# Patient Record
Sex: MALE | Race: WHITE | NOT HISPANIC OR LATINO | Employment: OTHER | URBAN - METROPOLITAN AREA
[De-identification: names, ages, dates, MRNs, and addresses within clinical notes are randomized per-mention and may not be internally consistent; named-entity substitution may affect disease eponyms.]

---

## 2017-06-29 ENCOUNTER — OFFICE VISIT (OUTPATIENT)
Dept: FAMILY MEDICINE | Facility: CLINIC | Age: 67
End: 2017-06-29
Payer: MEDICARE

## 2017-06-29 VITALS
DIASTOLIC BLOOD PRESSURE: 76 MMHG | BODY MASS INDEX: 29.44 KG/M2 | SYSTOLIC BLOOD PRESSURE: 122 MMHG | WEIGHT: 217.38 LBS | HEART RATE: 83 BPM | HEIGHT: 72 IN | TEMPERATURE: 98 F | OXYGEN SATURATION: 98 %

## 2017-06-29 DIAGNOSIS — R00.1 BRADYCARDIA: ICD-10-CM

## 2017-06-29 DIAGNOSIS — Z00.00 ROUTINE HEALTH MAINTENANCE: Primary | ICD-10-CM

## 2017-06-29 DIAGNOSIS — Z12.5 SCREENING PSA (PROSTATE SPECIFIC ANTIGEN): ICD-10-CM

## 2017-06-29 DIAGNOSIS — E78.00 ELEVATED CHOLESTEROL: ICD-10-CM

## 2017-06-29 PROCEDURE — 99397 PER PM REEVAL EST PAT 65+ YR: CPT | Mod: S$GLB,,, | Performed by: FAMILY MEDICINE

## 2017-06-29 RX ORDER — CETIRIZINE HYDROCHLORIDE 10 MG/1
10 TABLET ORAL DAILY
COMMUNITY

## 2017-07-01 LAB
ALBUMIN SERPL-MCNC: 4 G/DL (ref 3.6–5.1)
ALBUMIN/GLOB SERPL: 1.7 (CALC) (ref 1–2.5)
ALP SERPL-CCNC: 59 U/L (ref 40–115)
ALT SERPL-CCNC: 13 U/L (ref 9–46)
AST SERPL-CCNC: 16 U/L (ref 10–35)
BASOPHILS # BLD AUTO: 17 CELLS/UL (ref 0–200)
BASOPHILS NFR BLD AUTO: 0.3 %
BILIRUB SERPL-MCNC: 0.7 MG/DL (ref 0.2–1.2)
BUN SERPL-MCNC: 17 MG/DL (ref 7–25)
BUN/CREAT SERPL: NORMAL (CALC) (ref 6–22)
CALCIUM SERPL-MCNC: 9.3 MG/DL (ref 8.6–10.3)
CHLORIDE SERPL-SCNC: 109 MMOL/L (ref 98–110)
CHOLEST SERPL-MCNC: 204 MG/DL (ref 125–200)
CHOLEST/HDLC SERPL: 5.1 (CALC)
CO2 SERPL-SCNC: 26 MMOL/L (ref 20–31)
CREAT SERPL-MCNC: 0.94 MG/DL (ref 0.7–1.25)
EOSINOPHIL # BLD AUTO: 66 CELLS/UL (ref 15–500)
EOSINOPHIL NFR BLD AUTO: 1.2 %
ERYTHROCYTE [DISTWIDTH] IN BLOOD BY AUTOMATED COUNT: 13.9 % (ref 11–15)
GFR SERPL CREATININE-BSD FRML MDRD: 84 ML/MIN/1.73M2
GLOBULIN SER CALC-MCNC: 2.4 G/DL (CALC) (ref 1.9–3.7)
GLUCOSE SERPL-MCNC: 95 MG/DL (ref 65–99)
HCT VFR BLD AUTO: 44.1 % (ref 38.5–50)
HDLC SERPL-MCNC: 40 MG/DL
HGB BLD-MCNC: 14.8 G/DL (ref 13.2–17.1)
LDLC SERPL CALC-MCNC: 122 MG/DL (CALC)
LYMPHOCYTES # BLD AUTO: 2178 CELLS/UL (ref 850–3900)
LYMPHOCYTES NFR BLD AUTO: 39.6 %
MCH RBC QN AUTO: 29.9 PG (ref 27–33)
MCHC RBC AUTO-ENTMCNC: 33.4 G/DL (ref 32–36)
MCV RBC AUTO: 89.4 FL (ref 80–100)
MONOCYTES # BLD AUTO: 451 CELLS/UL (ref 200–950)
MONOCYTES NFR BLD AUTO: 8.2 %
NEUTROPHILS # BLD AUTO: 2789 CELLS/UL (ref 1500–7800)
NEUTROPHILS NFR BLD AUTO: 50.7 %
NONHDLC SERPL-MCNC: 164 MG/DL (CALC)
PLATELET # BLD AUTO: 196 THOUSAND/UL (ref 140–400)
PMV BLD REES-ECKER: 8.5 FL (ref 7.5–12.5)
POTASSIUM SERPL-SCNC: 4.4 MMOL/L (ref 3.5–5.3)
PROT SERPL-MCNC: 6.4 G/DL (ref 6.1–8.1)
PSA SERPL-MCNC: 0.7 NG/ML
RBC # BLD AUTO: 4.94 MILLION/UL (ref 4.2–5.8)
SODIUM SERPL-SCNC: 143 MMOL/L (ref 135–146)
TRIGL SERPL-MCNC: 209 MG/DL
TSH SERPL-ACNC: 1.45 MIU/L (ref 0.4–4.5)
WBC # BLD AUTO: 5.5 THOUSAND/UL (ref 3.8–10.8)

## 2017-07-03 ENCOUNTER — TELEPHONE (OUTPATIENT)
Dept: FAMILY MEDICINE | Facility: CLINIC | Age: 67
End: 2017-07-03

## 2017-07-03 NOTE — TELEPHONE ENCOUNTER
Notified the pt and he advised that he was not fasting for this blood work, pt advised to repeat in 1 year

## 2017-07-03 NOTE — TELEPHONE ENCOUNTER
Your lab work is very good    Your cholesterol is a little elevated including triglycerides but not to the point where I would add medicines to your regimen.    Recheck in one year

## 2017-07-08 NOTE — PROGRESS NOTES
Patient ID: Jose Rodríguez is a 67 y.o. male.    Chief Complaint: Annual Exam (wellness visit)    HPI      Jose Rodríguez is a 67 y.o. male. here for annual exam.   No current complaints.  Overall doing fair.  Still recovering from episode of serotonin syndrome.  Continues to get weak at some point in the day.  Mood is stable however does have some anxiety and depressive symptoms but not wanting to restart medicines at this time.  ALLERGIC rhinitis-stable with Zyrtec-periodic exacerbations        Review of Symptoms    Constitutional: Negative.    HENT: Negative.    Eyes: Negative.    Respiratory: Negative.    Cardiovascular: Negative.    Gastrointestinal: Negative.    Endocrine: Negative.    Genitourinary: Negative.    Musculoskeletal: Negative.    Skin: Negative.    Allergic/Immunologic: Negative.    Neurological: Negative.    Hematological: Negative.    Psychiatric/Behavioral: Negative.      Except as above in HPI        Physical  Exam    Constitutional:  Oriented to person, place, and time. Appears well-developed and well-nourished.     HENT:   Head: Normocephalic and atraumatic.     Right Ear: Tympanic membrane, external ear and ear canal normal.     Left Ear: Tympanic membrane, external ear and ear canal normal.     Nose: Nose normal. No rhinorrhea or nasal deformity.     Mouth/Throat: Uvula is midline, oropharynx is clear and moist and mucous membranes are normal.      Eyes: Conjunctivae are normal. Right eye exhibits no discharge. Left eye exhibits no discharge. No scleral icterus.     Neck:  No JVD present. No tracheal deviation  [x]  Neck supple.   [x]  No Carotid bruit    Cardiovascular: Normal rate, regular rhythm and normal heart sounds.      Pulmonary/Chest: Effort normal and breath sounds normal. No stridor. No respiratory distress. No wheezes. No rales.      Musculoskeletal: Normal range of motion. No edema or tenderness.   No deformity     Lymphadenopathy:  No cervical adenopathy.      Neurological:  Alert and oriented to person, place, and time. Coordination normal.     Skin: Skin is warm and dry. No rash noted.     Psychiatric: Normal mood and affect. Speech is normal and behavior is normal. Judgment and thought content normal.     Complete Blood Count  Lab Results   Component Value Date    RBC 4.94 06/30/2017    HGB 14.8 06/30/2017    HCT 44.1 06/30/2017    MCV 89.4 06/30/2017    MCH 29.9 06/30/2017    MCHC 33.4 06/30/2017    RDW 13.9 06/30/2017     06/30/2017    MPV 8.5 06/30/2017    GRAN 2.8 01/28/2016    GRAN 54.9 01/28/2016    LYMPH 2,178 06/30/2017    LYMPH 39.6 06/30/2017    MONO 451 06/30/2017    MONO 8.2 06/30/2017    EOS 66 06/30/2017    BASO 17 06/30/2017    EOSINOPHIL 1.2 06/30/2017    BASOPHIL 0.3 06/30/2017    DIFFMETHOD Automated 01/28/2016       Comprehensive Metabolic Panel  Lab Results   Component Value Date    GLU 95 06/30/2017    BUN 17 06/30/2017    CREATININE 0.94 06/30/2017     06/30/2017    K 4.4 06/30/2017     06/30/2017    PROT 6.4 06/30/2017    ALBUMIN 4.0 06/30/2017    BILITOT 0.7 06/30/2017    AST 16 06/30/2017    ALKPHOS 59 06/30/2017    CO2 26 06/30/2017    ALT 13 06/30/2017    ANIONGAP 6 (L) 01/28/2016    EGFRNONAA 84 06/30/2017    ESTGFRAFRICA 98 06/30/2017       TSH  Lab Results   Component Value Date    TSH 1.45 06/30/2017       Assessment / Plan:      ICD-10-CM ICD-9-CM   1. Routine health maintenance Z00.00 V70.0   2. Screening PSA (prostate specific antigen) Z12.5 V76.44   3. Bradycardia R00.1 427.89   4. Elevated cholesterol E78.00 272.0     Routine health maintenance  -     Comprehensive metabolic panel; Future  -     CBC auto differential; Future  -     Lipid panel; Future  -     TSH; Future  -     PSA, Screening; Future    Screening PSA (prostate specific antigen)  -     Comprehensive metabolic panel; Future  -     CBC auto differential; Future  -     Lipid panel; Future  -     TSH; Future  -     PSA, Screening;  Future    Bradycardia  -     Comprehensive metabolic panel; Future  -     CBC auto differential; Future  -     Lipid panel; Future  -     TSH; Future  -     PSA, Screening; Future    Elevated cholesterol  -     Comprehensive metabolic panel; Future  -     CBC auto differential; Future  -     Lipid panel; Future  -     TSH; Future  -     PSA, Screening; Future

## 2018-01-12 ENCOUNTER — PES CALL (OUTPATIENT)
Dept: ADMINISTRATIVE | Facility: CLINIC | Age: 68
End: 2018-01-12

## 2018-04-13 ENCOUNTER — PES CALL (OUTPATIENT)
Dept: ADMINISTRATIVE | Facility: CLINIC | Age: 68
End: 2018-04-13

## 2018-06-08 LAB
TSH SERPL DL<=0.005 MIU/L-ACNC: 4.06 UIU/ML (ref 0.41–5.9)
VITAMIN B12: 387.6

## 2018-07-02 ENCOUNTER — TELEPHONE (OUTPATIENT)
Dept: ADMINISTRATIVE | Facility: HOSPITAL | Age: 68
End: 2018-07-02

## 2018-07-02 ENCOUNTER — OFFICE VISIT (OUTPATIENT)
Dept: FAMILY MEDICINE | Facility: CLINIC | Age: 68
End: 2018-07-02
Payer: MEDICARE

## 2018-07-02 VITALS
DIASTOLIC BLOOD PRESSURE: 80 MMHG | HEIGHT: 72 IN | WEIGHT: 227.38 LBS | BODY MASS INDEX: 30.8 KG/M2 | HEART RATE: 77 BPM | TEMPERATURE: 98 F | OXYGEN SATURATION: 95 % | SYSTOLIC BLOOD PRESSURE: 118 MMHG

## 2018-07-02 DIAGNOSIS — R00.1 BRADYCARDIA: ICD-10-CM

## 2018-07-02 DIAGNOSIS — Z12.5 SCREENING PSA (PROSTATE SPECIFIC ANTIGEN): ICD-10-CM

## 2018-07-02 DIAGNOSIS — R53.83 FATIGUE, UNSPECIFIED TYPE: ICD-10-CM

## 2018-07-02 DIAGNOSIS — Z01.818 PREOPERATIVE EXAMINATION: ICD-10-CM

## 2018-07-02 DIAGNOSIS — Z23 NEED FOR PNEUMOCOCCAL VACCINATION: ICD-10-CM

## 2018-07-02 DIAGNOSIS — Z00.00 ROUTINE HEALTH MAINTENANCE: Primary | ICD-10-CM

## 2018-07-02 DIAGNOSIS — E78.00 ELEVATED CHOLESTEROL: ICD-10-CM

## 2018-07-02 PROCEDURE — G0009 ADMIN PNEUMOCOCCAL VACCINE: HCPCS | Mod: S$GLB,,, | Performed by: FAMILY MEDICINE

## 2018-07-02 PROCEDURE — 99397 PER PM REEVAL EST PAT 65+ YR: CPT | Mod: S$GLB,,, | Performed by: FAMILY MEDICINE

## 2018-07-02 PROCEDURE — 90732 PPSV23 VACC 2 YRS+ SUBQ/IM: CPT | Mod: S$GLB,,, | Performed by: FAMILY MEDICINE

## 2018-07-02 PROCEDURE — 93000 ELECTROCARDIOGRAM COMPLETE: CPT | Mod: S$GLB,,, | Performed by: INTERNAL MEDICINE

## 2018-07-02 RX ORDER — TRIAMCINOLONE ACETONIDE 55 UG/1
SPRAY, METERED NASAL
COMMUNITY
End: 2022-07-15

## 2018-07-02 RX ORDER — FAMCICLOVIR 500 MG/1
TABLET ORAL
COMMUNITY
End: 2022-07-15

## 2018-07-02 RX ORDER — LANOLIN ALCOHOL/MO/W.PET/CERES
100 CREAM (GRAM) TOPICAL DAILY
COMMUNITY

## 2018-07-02 RX ORDER — CITALOPRAM 10 MG/1
TABLET ORAL
COMMUNITY
End: 2019-08-16 | Stop reason: ALTCHOICE

## 2018-07-03 ENCOUNTER — TELEPHONE (OUTPATIENT)
Dept: FAMILY MEDICINE | Facility: CLINIC | Age: 68
End: 2018-07-03

## 2018-07-03 NOTE — TELEPHONE ENCOUNTER
----- Message from Aj Patrick MD sent at 7/3/2018  2:30 AM CDT -----  tsh is a bit high but still normal  Will repeat   b 12 is wnl      I notified the pt

## 2018-07-06 ENCOUNTER — TELEPHONE (OUTPATIENT)
Dept: FAMILY MEDICINE | Facility: CLINIC | Age: 68
End: 2018-07-06

## 2018-07-06 LAB
ALBUMIN: 4.2 GRAM/DL (ref 3.5–5)
ALP SERPL-CCNC: 64 UNIT/L (ref 38–126)
ALT SERPL W P-5'-P-CCNC: 12 UNIT/L (ref 7–56)
ANION GAP SERPL CALC-SCNC: 20 MEQ/L (ref 9–18)
APPEARANCE UR: CLEAR
AST SERPL-CCNC: 17 UNIT/L (ref 7–40)
BASOPHILS ABSOLUTE COUNT: 0 K/UL (ref 0–0.2)
BASOPHILS NFR BLD: 0.5 % (ref 0–2)
BILIRUB SERPL-MCNC: 0.9 MG/DL (ref 0–1.2)
BILIRUB UR QL STRIP: NEGATIVE
BUN BLD-MCNC: 20 MG/DL (ref 7–21)
BUN/CREAT SERPL: 18 RATIO (ref 6–22)
CALC OSMOLALITY: 289 MOSM/KG (ref 275–295)
CALCIUM SERPL-MCNC: 9.3 MG/DL (ref 8.5–10.3)
CHLORIDE SERPL-SCNC: 105 MEQ/L (ref 98–107)
CHOL/HDLC RATIO: 5
CHOLEST SERPL-MSCNC: 215 MG/DL (ref 100–200)
CO2 SERPL-SCNC: 23 MEQ/L (ref 21–31)
COLOR UR: YELLOW
CREAT SERPL-MCNC: 1.1 MG/DL (ref 0.7–1.2)
DIFFERENTIAL TYPE: NORMAL
EOSINOPHIL NFR BLD: 0.8 % (ref 0–4)
EOSINOPHILS ABSOLUTE COUNT: 0 K/UL (ref 0–0.7)
ERYTHROCYTE [DISTWIDTH] IN BLOOD BY AUTOMATED COUNT: 14 GRAM/DL (ref 12–15.3)
FREE T4: 1.3 NANOGRAM/DL (ref 0.9–1.7)
GFR: 68.9 ML/MIN/1.73M2
GLUCOSE SERPL-MCNC: 92 MG/DL (ref 70–100)
GLUCOSE UR QL STRIP: NEGATIVE
HCT VFR BLD AUTO: 42.1 % (ref 40–52)
HDLC SERPL-MCNC: 43 MG/DL (ref 40–75)
HGB BLD-MCNC: 14.5 GRAM/DL (ref 13.6–17.5)
HGB UR QL STRIP: NEGATIVE
KETONES UR QL STRIP: NEGATIVE
LDLC SERPL CALC-MCNC: 149 MG/DL (ref 0–130)
LEUKOCYTE ESTERASE UR QL STRIP: NEGATIVE
LYMPHOCYTES %: 32.8 % (ref 15–45)
LYMPHOCYTES ABSOLUTE COUNT: 2 K/UL (ref 1–4.2)
MCH RBC QN AUTO: 29.9 PICOGRAM (ref 27–33)
MCHC RBC AUTO-ENTMCNC: 34.4 GRAM/DL (ref 32–36)
MCV RBC AUTO: 86.9 FEMTOLITER (ref 80–94)
MONOCYTES %: 11.5 % (ref 3–13)
MONOCYTES ABSOLUTE COUNT: 0.7 K/UL (ref 0.1–0.8)
NEUTROPHILS ABSOLUTE COUNT: 3.4 K/UL (ref 2.1–7.6)
NEUTROPHILS RELATIVE PERCENT: 54.4 % (ref 32–80)
NITRITE UR QL STRIP: NEGATIVE
NONHDLC SERPL-MCNC: 172 MG/DL (ref 60–125)
PH UR STRIP: NORMAL [PH] (ref 5–8)
PLATELET # BLD AUTO: 197 K/UL (ref 150–350)
PMV BLD AUTO: 8.4 FEMTOLITER (ref 7–10.2)
POTASSIUM SERPL-SCNC: 4.4 MEQ/L (ref 3.5–5)
PROSTATE SPECIFIC ANTIGEN, TOTAL: 0.65 NANOGRAM/ML (ref 0–3.99)
PROT UR QL STRIP: NEGATIVE
RBC # BLD AUTO: 4.84 MIL/UL (ref 4.45–5.9)
SODIUM BLD-SCNC: 144 MEQ/L (ref 135–145)
SP GR UR STRIP: 1.02 (ref 1–1.03)
TOTAL PROTEIN: 6.7 GRAM/DL (ref 6.3–8.2)
TRIGL SERPL-MCNC: 142 MG/DL (ref 30–150)
TSH SERPL DL<=0.005 MIU/L-ACNC: 3.32 UIL/ML (ref 0.35–4)
WBC # BLD AUTO: 6.2 K/UL (ref 4.5–11)

## 2018-07-06 NOTE — TELEPHONE ENCOUNTER
----- Message from Ines Sachi sent at 7/6/2018 10:00 AM CDT -----  Contact: Alla eagle/ [facility] / [phone]  Please send her the chest xray, ekg and recent labs on this pt.  The preop dept needs to review this information.  Please fax to [phone]      I faxed release this am to [facility] for pt   Last last EKG and CXR during admission recently    I spoke with Alla at  preop no CXR done this year and she will fax last ekg from 2017    All reports and clearance faxed

## 2018-07-06 NOTE — PROGRESS NOTES
Patient ID: Jose Rodríguze is a 68 y.o. male.    Chief Complaint: Annual Exam (wellness)    HPI      Jose Rodríguez is a 68 y.o. male. here for annual exam as well as preoperative evaluation.  He is currently without any complaints of upper or lower respiratory symptoms.  No fever chills nausea vomiting diarrhea.  No chest pain or palpitations.  He currently is not able to exercise fully because of knee pain. However is able to walk up flights of stairs without shortness of breath and do physical activity with no chest pain or other cardiac symptoms.  He has no PND orthopnea or swelling at this time.    Review of Symptoms    Constitutional: Negative.    HENT: Negative.    Eyes: Negative.    Respiratory: Negative.    Cardiovascular: Negative.    Gastrointestinal: Negative.    Endocrine: Negative.    Genitourinary: Negative.    Musculoskeletal: Negative.    Skin: Negative.    Allergic/Immunologic: Negative.    Neurological: Negative.    Hematological: Negative.    Psychiatric/Behavioral: Negative.      Except as above in HPI      /80   Pulse 77   Temp 98.2 °F (36.8 °C)   Ht 6' (1.829 m)   Wt 103.1 kg (227 lb 6.4 oz)   SpO2 95%   BMI 30.84 kg/m²     Physical  Exam    Constitutional:  Oriented to person, place, and time. Appears well-developed and well-nourished.     HENT:   Head: Normocephalic and atraumatic.     Right Ear: Tympanic membrane, external ear and ear canal normal.     Left Ear: Tympanic membrane, external ear and ear canal normal.     Nose: Nose normal. No rhinorrhea or nasal deformity.     Mouth/Throat: Uvula is midline, oropharynx is clear and moist and mucous membranes are normal.      Eyes: Conjunctivae are normal. Right eye exhibits no discharge. Left eye exhibits no discharge. No scleral icterus.     Neck:  No JVD present. No tracheal deviation  [x]  Neck supple.   []  No Carotid bruit    Cardiovascular: Normal rate, regular rhythm and normal heart sounds.      Pulmonary/Chest:  Effort normal and breath sounds normal. No stridor. No respiratory distress. No wheezes. No rales.      Musculoskeletal: Normal range of motion. No edema or tenderness.   No deformity     Lymphadenopathy:  No cervical adenopathy.     Neurological:  Alert and oriented to person, place, and time. Coordination normal.     Skin: Skin is warm and dry. No rash noted.     Psychiatric: Normal mood and affect. Speech is normal and behavior is normal. Judgment and thought content normal.     Complete Blood Count  Lab Results   Component Value Date    RBC 4.84 07/05/2018    HGB 14.5 07/05/2018    HCT 42.1 07/05/2018    MCV 86.9 07/05/2018    MCH 29.9 07/05/2018    MCHC 34.4 07/05/2018    RDW 14.0 07/05/2018     07/05/2018    MPV 8.4 07/05/2018    GRAN 2.8 01/28/2016    GRAN 54.9 01/28/2016    LYMPH 2,178 06/30/2017    LYMPH 39.6 06/30/2017    MONO 451 06/30/2017    MONO 8.2 06/30/2017    EOS 66 06/30/2017    BASO 17 06/30/2017    EOSINOPHIL 0.8 07/05/2018    BASOPHIL 0.5 07/05/2018    DIFFMETHOD Automated 01/28/2016       Comprehensive Metabolic Panel  Lab Results   Component Value Date    GLU 92 07/05/2018    BUN 20 07/05/2018    CREATININE 1.1 07/05/2018     07/05/2018    K 4.4 07/05/2018     07/05/2018    PROT 6.7 07/05/2018    ALBUMIN 4.2 07/05/2018    BILITOT 0.9 07/05/2018    AST 17 07/05/2018    ALKPHOS 64 07/05/2018    CO2 23 07/05/2018    ALT 12 07/05/2018    ANIONGAP 20 (H) 07/05/2018       TSH  Lab Results   Component Value Date    TSH 3.32 07/05/2018       Assessment / Plan:      ICD-10-CM ICD-9-CM   1. Routine health maintenance Z00.00 V70.0   2. Screening PSA (prostate specific antigen) Z12.5 V76.44   3. Bradycardia R00.1 427.89   4. Elevated cholesterol E78.00 272.0   5. Preoperative examination Z01.818 V72.84   6. Need for pneumococcal vaccination Z23 V03.82   7. Fatigue, unspecified type R53.83 780.79     Routine health maintenance  -     Comprehensive metabolic panel; Future  -     CBC auto  differential; Future  -     Lipid panel; Future  -     TSH; Future  -     PSA, Screening; Future  -     X-Ray Chest PA And Lateral; Future; Expected date: 07/02/2018  -     Urinalysis; Future; Expected date: 07/02/2018  -     Cancel: EKG 12-lead  -     Cancel: EKG 12-lead  -     T4, free; Future; Expected date: 07/02/2018    Screening PSA (prostate specific antigen)  -     Comprehensive metabolic panel; Future  -     CBC auto differential; Future  -     Lipid panel; Future  -     TSH; Future  -     PSA, Screening; Future  -     X-Ray Chest PA And Lateral; Future; Expected date: 07/02/2018  -     Urinalysis; Future; Expected date: 07/02/2018  -     Cancel: EKG 12-lead  -     Cancel: EKG 12-lead  -     T4, free; Future; Expected date: 07/02/2018    Bradycardia  -     Comprehensive metabolic panel; Future  -     CBC auto differential; Future  -     Lipid panel; Future  -     TSH; Future  -     PSA, Screening; Future  -     X-Ray Chest PA And Lateral; Future; Expected date: 07/02/2018  -     Urinalysis; Future; Expected date: 07/02/2018  -     Cancel: EKG 12-lead  -     Cancel: EKG 12-lead  -     T4, free; Future; Expected date: 07/02/2018    Elevated cholesterol  -     Comprehensive metabolic panel; Future  -     CBC auto differential; Future  -     Lipid panel; Future  -     TSH; Future  -     PSA, Screening; Future  -     X-Ray Chest PA And Lateral; Future; Expected date: 07/02/2018  -     Urinalysis; Future; Expected date: 07/02/2018  -     Cancel: EKG 12-lead  -     Cancel: EKG 12-lead  -     T4, free; Future; Expected date: 07/02/2018    Preoperative examination  -     Comprehensive metabolic panel; Future  -     CBC auto differential; Future  -     Lipid panel; Future  -     TSH; Future  -     PSA, Screening; Future  -     X-Ray Chest PA And Lateral; Future; Expected date: 07/02/2018  -     Urinalysis; Future; Expected date: 07/02/2018  -     Cancel: EKG 12-lead  -     Cancel: EKG 12-lead  -     T4, free; Future;  "Expected date: 07/02/2018  -     IN OFFICE EKG 12-LEAD (to [facility])    Need for pneumococcal vaccination  -     (In Office Administered) Pneumococcal Polysaccharide Vaccine (23 Valent) (SQ/IM)  -     Cancel: EKG 12-lead  -     T4, free; Future; Expected date: 07/02/2018    Fatigue, unspecified type  -     Cancel: EKG 12-lead  -     T4, free; Future; Expected date: 07/02/2018    Other orders  -     CBC w/diff and Platelet Count  -     Differential  -     Urinalysis  -     T4, free      Note added-07/06/2018  EKG shows borderline changes-no cardiovascular symptoms.  Previous nuclear stress test 2016 no ischemia.  Chest x-ray done recently at [facility] small band of suspected atelectasis or scarring in the inferior lingula."  Correlate clinically  Clinically without problems-normal WBCs without symptoms or sign of infection.  Trying to locate previous x-rays for comparison.    Currenty we in good physical condition at this time.  If this chest x-ray is similar to previous chest x-rays find him in good visit to condition is stable for upcoming surgery.   Per discussion with  anesthesia preoperative evaluation later today.    Discussed how to stay healthy including: diet, exercise, refraining from smoking and discussed screening exams / tests needed for age, sex and family Hx.  "

## 2018-08-24 ENCOUNTER — PES CALL (OUTPATIENT)
Dept: ADMINISTRATIVE | Facility: CLINIC | Age: 68
End: 2018-08-24

## 2018-09-18 ENCOUNTER — TELEPHONE (OUTPATIENT)
Dept: FAMILY MEDICINE | Facility: CLINIC | Age: 68
End: 2018-09-18

## 2018-09-18 NOTE — TELEPHONE ENCOUNTER
I spoke with the pt   He will drop off the form for concealed weapon   I advised I will call him to pick it up once it is completed

## 2018-09-18 NOTE — TELEPHONE ENCOUNTER
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     Last office visit was July   Are you willing to complete a form or do you need to see him  Please let us know and we can notify the pt

## 2019-02-06 ENCOUNTER — PES CALL (OUTPATIENT)
Dept: ADMINISTRATIVE | Facility: CLINIC | Age: 69
End: 2019-02-06

## 2019-07-17 ENCOUNTER — PES CALL (OUTPATIENT)
Dept: ADMINISTRATIVE | Facility: CLINIC | Age: 69
End: 2019-07-17

## 2019-08-14 ENCOUNTER — TELEPHONE (OUTPATIENT)
Dept: FAMILY MEDICINE | Facility: CLINIC | Age: 69
End: 2019-08-14

## 2019-08-14 DIAGNOSIS — Z01.818 PREOPERATIVE EXAMINATION: ICD-10-CM

## 2019-08-14 DIAGNOSIS — R00.1 BRADYCARDIA: Primary | ICD-10-CM

## 2019-08-14 NOTE — TELEPHONE ENCOUNTER
----- Message from Maureen Pepe sent at 8/14/2019 10:46 AM CDT -----  Contact: [phone]/self  Patient is requesting order for lab work be emailed to him. He gets labs done at . He is coming in on Friday for surgery clearance. Please call him to confirm. Thanks  His surgery date is moved up to 08/23  [email or web address]

## 2019-08-14 NOTE — TELEPHONE ENCOUNTER
----- Message from Macy Aguilar sent at 8/14/2019 10:19 AM CDT -----  Contact: [phone]-self  Patient requesting a callback concerning being seen for a medical clearance before 08/28 (date of surgery), as well as annual. [phone]-self    Dr Patrick -  I have scheduled the pt to see you next Friday august 23  For preop for dr cowan  Please order labs,ekg, and chest xray for external  I advised the pt that I will email these orders tomorrow ( Thursday )   He will do at [facility] prior to this appt with you on august 23  Kalin at   [email or web address]

## 2019-08-16 ENCOUNTER — OFFICE VISIT (OUTPATIENT)
Dept: FAMILY MEDICINE | Facility: CLINIC | Age: 69
End: 2019-08-16
Payer: MEDICARE

## 2019-08-16 VITALS
SYSTOLIC BLOOD PRESSURE: 108 MMHG | TEMPERATURE: 98 F | WEIGHT: 224 LBS | OXYGEN SATURATION: 96 % | HEIGHT: 72 IN | DIASTOLIC BLOOD PRESSURE: 70 MMHG | HEART RATE: 86 BPM | BODY MASS INDEX: 30.34 KG/M2

## 2019-08-16 DIAGNOSIS — M25.561 CHRONIC PAIN OF RIGHT KNEE: Primary | ICD-10-CM

## 2019-08-16 DIAGNOSIS — G89.29 CHRONIC PAIN OF RIGHT KNEE: Primary | ICD-10-CM

## 2019-08-16 PROCEDURE — 99213 PR OFFICE/OUTPT VISIT, EST, LEVL III, 20-29 MIN: ICD-10-PCS | Mod: S$GLB,,, | Performed by: FAMILY MEDICINE

## 2019-08-16 PROCEDURE — 1101F PT FALLS ASSESS-DOCD LE1/YR: CPT | Mod: CPTII,S$GLB,, | Performed by: FAMILY MEDICINE

## 2019-08-16 PROCEDURE — 1101F PR PT FALLS ASSESS DOC 0-1 FALLS W/OUT INJ PAST YR: ICD-10-PCS | Mod: CPTII,S$GLB,, | Performed by: FAMILY MEDICINE

## 2019-08-16 PROCEDURE — 99213 OFFICE O/P EST LOW 20 MIN: CPT | Mod: S$GLB,,, | Performed by: FAMILY MEDICINE

## 2019-08-16 RX ORDER — MELATONIN 5 MG
CAPSULE ORAL
COMMUNITY
End: 2020-01-21

## 2019-08-16 NOTE — LETTER
August 16, 2019                     [address] [address]  Phone: [phone]  Fax: [phone]   Patient: Jose Rodríguez   MR Number: [id number]   YOB: 1950   Date of Visit: 8/16/2019     Dear Dr. Hahn,     I saw Mr Rodríguez in my clinic today for pre-op clearance.  He is scheduled for TKA with you on 8/26/2019.    As you know, Mr Rodríguez is a very pleasant 69 year old male with a history of knee pain.  He had his left knee replaced in the past and did well with it.  He has no history of CAD, DM, hypertension or pulmonary disease.      Mr. Rodríguez is an acceptable candidate for surgery.        Sincerely,      Hope Ibrahim, DO            CC  No Recipients    Enclosure

## 2019-08-16 NOTE — PROGRESS NOTES
Chief Complaint  Chief Complaint   Patient presents with    Pre-op Exam     patient here to get pre op clearance        HPI  Jose Rodríguez is a 69 y.o. male with multiple medical diagnoses as listed in the medical history and problem list that presents for preop clearance.  He is schedule for RTKA on 8/26/19 with Dr Hahn at [facility].  He has no history of CAD, DM, hypertension or pulmonary disears.  He takes OTC antihistamines for allergy symptoms.       PAST MEDICAL HISTORY:  Past Medical History:   Diagnosis Date    Anxiety     Depression     Melanoma     Myasthenia gravis        PAST SURGICAL HISTORY:  Past Surgical History:   Procedure Laterality Date    COLONOSCOPY-Miralax prep N/A 5/20/2016    Performed by Lucien Rader Jr., MD at [facility]    HERNIA REPAIR      inguinal double    KNEE ARTHROSCOPY W/ MENISCAL REPAIR Right     LAMINECTOMY  03/2019    mya      SKIN SURGERY      melanoma removal    SPINE SURGERY      L5-S1    THYMECTOMY      TOTAL KNEE ARTHROPLASTY Left        SOCIAL HISTORY:  Social History     Socioeconomic History    Marital status:      Spouse name: Not on file    Number of children: Not on file    Years of education: Not on file    Highest education level: Not on file   Occupational History    Not on file   Social Needs    Financial resource strain: Not on file    Food insecurity:     Worry: Not on file     Inability: Not on file    Transportation needs:     Medical: Not on file     Non-medical: Not on file   Tobacco Use    Smoking status: Never Smoker    Smokeless tobacco: Never Used   Substance and Sexual Activity    Alcohol use: No    Drug use: Not Currently    Sexual activity: Not on file   Lifestyle    Physical activity:     Days per week: Not on file     Minutes per session: Not on file    Stress: Not at all   Relationships    Social connections:     Talks on phone: Not on file     Gets together: Not on file     Attends  Yazidism service: Not on file     Active member of club or organization: Not on file     Attends meetings of clubs or organizations: Not on file     Relationship status: Not on file   Other Topics Concern    Not on file   Social History Narrative    Not on file       FAMILY HISTORY:  Family History   Problem Relation Age of Onset    COPD Mother     Cancer Father         liver       ALLERGIES AND MEDICATIONS: updated and reviewed.  Review of patient's allergies indicates:   Allergen Reactions    Oseltamivir      Other reaction(s): Confusion    Vancomycin Rash     Current Outpatient Medications   Medication Sig Dispense Refill    cetirizine (ZYRTEC) 10 MG tablet Take 10 mg by mouth once daily.      cyanocobalamin (VITAMIN B-12) 1000 MCG tablet Take 100 mcg by mouth once daily.      famciclovir (FAMVIR) 500 MG tablet famciclovir 500 mg tablet   TAKE 1 TABLET BY MOUTH EVERY DAY      melatonin 5 mg Cap melatonin 5 mg      triamcinolone (NASACORT) 55 mcg nasal inhaler nasacort allergy 24 hour gener       No current facility-administered medications for this visit.          ROS  Review of Systems   Constitutional: Negative for activity change, appetite change, chills and fatigue.   HENT: Negative for congestion, ear discharge, hearing loss, mouth sores, rhinorrhea, sinus pain and trouble swallowing.    Eyes: Negative for photophobia, pain, discharge and visual disturbance.   Respiratory: Negative for cough, chest tightness, shortness of breath and wheezing.    Cardiovascular: Negative for chest pain, palpitations and leg swelling.   Gastrointestinal: Negative for abdominal distention, abdominal pain, blood in stool, constipation, diarrhea, nausea and vomiting.   Genitourinary: Negative for difficulty urinating, dysuria and flank pain.   Musculoskeletal: Negative for arthralgias, back pain, gait problem, joint swelling and myalgias.   Skin: Negative for color change and rash.   Neurological: Negative for  dizziness, tremors, speech difficulty, light-headedness, numbness and headaches.   Psychiatric/Behavioral: Negative for behavioral problems, confusion, hallucinations, sleep disturbance and suicidal ideas.           PHYSICAL EXAM  Vitals:    08/16/19 1327   BP: 108/70   BP Location: Left arm   Patient Position: Sitting   Pulse: 86   Temp: 98.2 °F (36.8 °C)   TempSrc: Oral   SpO2: 96%   Weight: 101.6 kg (224 lb)   Height: 6' (1.829 m)    Body mass index is 30.38 kg/m².  Weight: 101.6 kg (224 lb)   Height: 6' (182.9 cm)     Physical Exam   Constitutional: He appears well-developed.   HENT:   Head: Normocephalic.   Eyes: Conjunctivae are normal.   Neck: Normal range of motion. Neck supple.   Cardiovascular: Normal rate and regular rhythm.   Pulmonary/Chest: Effort normal and breath sounds normal.   Neurological: He is alert.   Skin: Skin is warm.   Psychiatric: He has a normal mood and affect.   Nursing note and vitals reviewed.        Health Maintenance       Date Due Completion Date    Shingles Vaccine (1 of 2) 10/01/2019 (Originally 5/18/2001) ---    Influenza Vaccine (1) 10/01/2019 (Originally 8/1/2019) 10/9/2009    Hepatitis C Screening 10/31/2019 (Originally 1950) ---    TETANUS VACCINE 01/01/2022 1/1/2012    Lipid Panel 07/05/2023 7/5/2018    Colonoscopy 05/20/2026 5/20/2016            Assessment & Plan      Jose was seen today for pre-op exam.    Diagnoses and all orders for this visit:    Chronic pain of right knee      Patient is an acceptable candidate for the proposed surgery.     Follow-up: No follow-ups on file.

## 2020-01-20 ENCOUNTER — TELEPHONE (OUTPATIENT)
Dept: FAMILY MEDICINE | Facility: CLINIC | Age: 70
End: 2020-01-20

## 2020-01-20 ENCOUNTER — TELEPHONE (OUTPATIENT)
Dept: NEUROLOGY | Facility: CLINIC | Age: 70
End: 2020-01-20

## 2020-01-20 NOTE — TELEPHONE ENCOUNTER
----- Message from Maureen Pepe sent at 1/20/2020  1:23 PM CST -----  Contact: [phone]/self  Type:  Sooner Apoointment Request    Caller is requesting a sooner appointment.  Name of Caller: self  When is the first available appointment?   Symptoms: Diagnosed with Parkinson's diseas and needs a follow up  Would the patient rather a call back or a response via [facility]?  call  Best Call Back Number: [phone]  Additional Information:  GAY WIGGINS [[id number]] also

## 2020-01-21 ENCOUNTER — OFFICE VISIT (OUTPATIENT)
Dept: FAMILY MEDICINE | Facility: CLINIC | Age: 70
End: 2020-01-21
Payer: MEDICARE

## 2020-01-21 VITALS
WEIGHT: 229.25 LBS | HEIGHT: 72 IN | HEART RATE: 75 BPM | OXYGEN SATURATION: 97 % | BODY MASS INDEX: 31.05 KG/M2 | SYSTOLIC BLOOD PRESSURE: 124 MMHG | DIASTOLIC BLOOD PRESSURE: 86 MMHG | TEMPERATURE: 98 F

## 2020-01-21 DIAGNOSIS — G20.A1 PARKINSON DISEASE: ICD-10-CM

## 2020-01-21 DIAGNOSIS — Z12.5 SCREENING PSA (PROSTATE SPECIFIC ANTIGEN): ICD-10-CM

## 2020-01-21 DIAGNOSIS — Z00.00 ROUTINE HEALTH MAINTENANCE: Primary | ICD-10-CM

## 2020-01-21 DIAGNOSIS — E78.00 ELEVATED CHOLESTEROL: ICD-10-CM

## 2020-01-21 PROCEDURE — 99397 PER PM REEVAL EST PAT 65+ YR: CPT | Mod: S$GLB,,, | Performed by: FAMILY MEDICINE

## 2020-01-21 PROCEDURE — 99499 UNLISTED E&M SERVICE: CPT | Mod: S$GLB,,, | Performed by: FAMILY MEDICINE

## 2020-01-21 PROCEDURE — 99499 RISK ADDL DX/OHS AUDIT: ICD-10-PCS | Mod: S$GLB,,, | Performed by: FAMILY MEDICINE

## 2020-01-21 PROCEDURE — 99397 PR PREVENTIVE VISIT,EST,65 & OVER: ICD-10-PCS | Mod: S$GLB,,, | Performed by: FAMILY MEDICINE

## 2020-01-21 RX ORDER — CITALOPRAM 10 MG/1
10 TABLET ORAL
COMMUNITY
Start: 2017-09-26 | End: 2020-10-13

## 2020-01-21 RX ORDER — CARBIDOPA AND LEVODOPA 25; 100 MG/1; MG/1
0.5 TABLET ORAL 2 TIMES DAILY
COMMUNITY
Start: 2019-12-23 | End: 2020-09-14

## 2020-01-21 RX ORDER — CLONAZEPAM 0.5 MG/1
0.5 TABLET ORAL DAILY
COMMUNITY
Start: 2020-01-10

## 2020-01-21 RX ORDER — SILDENAFIL CITRATE 20 MG/1
TABLET ORAL
Qty: 30 TABLET | Refills: 2 | Status: SHIPPED | OUTPATIENT
Start: 2020-01-21 | End: 2020-10-13

## 2020-01-21 NOTE — PROGRESS NOTES
Patient ID: Jose Rodríguez is a 69 y.o. male.    Chief Complaint: Parkinson's/ Enlarged prostrate    HPI       Jose Rodríguez is a 69 y.o. male patient here for evaluation regarding chronic medical conditions.  Recent diagnosis of heart disease.  Has been started on Sinemet.  Doing okay would like to have referral to movement disorders Clinic.  Patient would also like to start a program where he would go to [employer] classes.  History chart shows that this is beneficial for people with Parkinson's disease.  Also for routine health maintenance    Review of Symptoms    Constitutional  some activity change, No chills fever   Resp  Neg hemoptysis, stridor, choking  CVS  Neg chest pain, palpitations    /86   Pulse 75   Temp 97.9 °F (36.6 °C)   Ht 6' (1.829 m)   Wt 104 kg (229 lb 4.5 oz)   SpO2 97%   BMI 31.10 kg/m²     Physical Exam    Vitals:    01/21/20 1005   BP: 124/86   Pulse: 75   Temp: 97.9 °F (36.6 °C)   SpO2: 97%   Weight: 104 kg (229 lb 4.5 oz)   Height: 6' (1.829 m)       Constitutional:   Oriented to person, place, and time.appears well-developed and well-nourished.   No distress.      HENT  Head: Normocephalic and atraumatic  Right Ear: External ear normal.   Left Ear: External ear normal.   Nose: External nose normal.   Mouth: Moist mucous membranes    Eyes:   Conjunctivae are normal. Right eye exhibits no discharge. Left eye exhibits no discharge. No scleral icterus. No periorbital edema    Musculoskeletal:  No edema. No obvious deformity No wasting     Neurological:  Alert and oriented to person, place, and time.     Skin:   Skin is warm and dry.  No diaphoresis.   No rash noted.     Psychiatric: Normal mood and affect. Behavior is normal. Judgment and thought content normal.     Complete Blood Count  Lab Results   Component Value Date    RBC 5.03 01/24/2020    HGB 15.0 01/24/2020    HCT 43.5 01/24/2020    MCV 86.4 01/24/2020    MCH 29.9 01/24/2020    MCHC 34.6 01/24/2020    RDW  14.9 01/24/2020     01/24/2020    MPV 8.5 01/24/2020    GRAN 2.8 01/28/2016    GRAN 54.9 01/28/2016    LYMPH 2,178 06/30/2017    LYMPH 39.6 06/30/2017    MONO 451 06/30/2017    MONO 8.2 06/30/2017    EOS 66 06/30/2017    BASO 17 06/30/2017    EOSINOPHIL 1.3 01/24/2020    BASOPHIL 0.6 01/24/2020    DIFFMETHOD Automated 01/28/2016       Comprehensive Metabolic Panel  Lab Results   Component Value Date    GLU 94 01/24/2020    BUN 21 01/24/2020    CREATININE 1.0 01/24/2020     01/24/2020    K 4.4 01/24/2020     01/24/2020    PROT 6.6 01/24/2020    ALBUMIN 4.3 01/24/2020    BILITOT 0.8 01/24/2020    AST 18 01/24/2020    ALKPHOS 68 01/24/2020    CO2 24 01/24/2020    ALT <10 (L) 01/24/2020    ANIONGAP 17 01/24/2020       TSH  Lab Results   Component Value Date    TSH 1.85 01/24/2020       Assessment / Plan:      ICD-10-CM ICD-9-CM   1. Routine health maintenance Z00.00 V70.0   2. Elevated cholesterol E78.00 272.0   3. Parkinson disease G20 332.0   4. Screening PSA (prostate specific antigen) Z12.5 V76.44     Routine health maintenance  -     Comprehensive metabolic panel; Future; Expected date: 01/21/2020  -     CBC auto differential; Future; Expected date: 01/21/2020  -     TSH; Future; Expected date: 01/21/2020  -     Lipid panel; Future; Expected date: 01/21/2020    Elevated cholesterol  -     Comprehensive metabolic panel; Future; Expected date: 01/21/2020  -     CBC auto differential; Future; Expected date: 01/21/2020  -     TSH; Future; Expected date: 01/21/2020  -     Lipid panel; Future; Expected date: 01/21/2020    Parkinson disease  -     Comprehensive metabolic panel; Future; Expected date: 01/21/2020  -     CBC auto differential; Future; Expected date: 01/21/2020  -     TSH; Future; Expected date: 01/21/2020  -     Lipid panel; Future; Expected date: 01/21/2020  -     Ambulatory referral to Neurology    Screening PSA (prostate specific antigen)  -     PSA, Screening; Future    Other orders  -      sildenafil (REVATIO) 20 mg Tab; Take 3 to 5 pills each pm as needed for ed  Dispense: 30 tablet; Refill: 2

## 2020-01-21 NOTE — TELEPHONE ENCOUNTER
----- Message from Lali Montero MA sent at 1/17/2020  5:21 PM CST -----  Contact: Pt      ----- Message -----  From: Robb Low  Sent: 1/17/2020   3:59 PM CST  To: [facility] Staff        The Pt would like to be scheduled for an appt for parkinsons.  The Pt states that he called a week ago and nobody called him back and he would like a call back please.    Phone # [phone]

## 2020-01-24 LAB
ALBUMIN: 4.3 GRAM/DL (ref 3.5–5)
ALP SERPL-CCNC: 68 UNIT/L (ref 38–126)
ALT SERPL W P-5'-P-CCNC: <10 UNIT/L (ref 7–56)
ANION GAP SERPL CALC-SCNC: 17 MEQ/L (ref 9–18)
AST SERPL-CCNC: 18 UNIT/L (ref 7–40)
BASOPHILS ABSOLUTE COUNT: 0 K/UL (ref 0–0.2)
BASOPHILS NFR BLD: 0.6 % (ref 0–2)
BILIRUB SERPL-MCNC: 0.8 MG/DL (ref 0–1.2)
BUN BLD-MCNC: 21 MG/DL (ref 7–21)
BUN/CREAT SERPL: 21 RATIO (ref 6–22)
CALC OSMOLALITY: 282 MOSM/KG (ref 275–295)
CALCIUM SERPL-MCNC: 9.3 MG/DL (ref 8.5–10.3)
CHLORIDE SERPL-SCNC: 103 MEQ/L (ref 98–107)
CHOL/HDLC RATIO: 5
CHOLEST SERPL-MSCNC: 215 MG/DL (ref 100–200)
CO2 SERPL-SCNC: 24 MEQ/L (ref 21–31)
CREAT SERPL-MCNC: 1 MG/DL (ref 0.7–1.2)
DIFFERENTIAL TYPE: NORMAL
EOSINOPHIL NFR BLD: 1.3 % (ref 0–4)
EOSINOPHILS ABSOLUTE COUNT: 0.1 K/UL (ref 0–0.7)
ERYTHROCYTE [DISTWIDTH] IN BLOOD BY AUTOMATED COUNT: 14.9 GRAM/DL (ref 12–15.3)
GFR: 74.3 ML/MIN/1.73M2
GLUCOSE SERPL-MCNC: 94 MG/DL (ref 70–100)
HCT VFR BLD AUTO: 43.5 % (ref 40–52)
HDLC SERPL-MCNC: 45 MG/DL (ref 40–75)
HGB BLD-MCNC: 15 GRAM/DL (ref 13.6–17.5)
LDLC SERPL CALC-MCNC: 142 MG/DL (ref 0–130)
LYMPHOCYTES %: 35.5 % (ref 15–45)
LYMPHOCYTES ABSOLUTE COUNT: 2 K/UL (ref 1–4.2)
MCH RBC QN AUTO: 29.9 PICOGRAM (ref 27–33)
MCHC RBC AUTO-ENTMCNC: 34.6 GRAM/DL (ref 32–36)
MCV RBC AUTO: 86.4 FEMTOLITER (ref 80–94)
MONOCYTES %: 11.6 % (ref 3–13)
MONOCYTES ABSOLUTE COUNT: 0.7 K/UL (ref 0.1–0.8)
NEUTROPHILS ABSOLUTE COUNT: 2.9 K/UL (ref 2.1–7.6)
NEUTROPHILS RELATIVE PERCENT: 51 % (ref 32–80)
NONHDLC SERPL-MCNC: 170 MG/DL (ref 60–125)
PLATELET # BLD AUTO: 195 K/UL (ref 150–350)
PMV BLD AUTO: 8.5 FEMTOLITER (ref 7–10.2)
POTASSIUM SERPL-SCNC: 4.4 MEQ/L (ref 3.5–5)
PROSTATE SPECIFIC ANTIGEN, TOTAL: 0.7 NANOGRAM/ML (ref 0–3.99)
RBC # BLD AUTO: 5.03 MIL/UL (ref 4.45–5.9)
SODIUM BLD-SCNC: 140 MEQ/L (ref 135–145)
TOTAL PROTEIN: 6.6 GRAM/DL (ref 6.3–8.2)
TRIGL SERPL-MCNC: 172 MG/DL (ref 30–150)
TSH SERPL DL<=0.005 MIU/L-ACNC: 1.85 UIL/ML (ref 0.35–4)
WBC # BLD AUTO: 5.7 K/UL (ref 4.5–11)

## 2020-01-24 RX ORDER — SILDENAFIL 100 MG/1
100 TABLET, FILM COATED ORAL DAILY PRN
Qty: 30 TABLET | Refills: 1 | Status: SHIPPED | OUTPATIENT
Start: 2020-01-24 | End: 2022-07-15

## 2020-02-18 ENCOUNTER — PES CALL (OUTPATIENT)
Dept: ADMINISTRATIVE | Facility: CLINIC | Age: 70
End: 2020-02-18

## 2020-05-14 ENCOUNTER — TELEPHONE (OUTPATIENT)
Dept: NEUROLOGY | Facility: CLINIC | Age: 70
End: 2020-05-14

## 2020-05-14 NOTE — TELEPHONE ENCOUNTER
Called and left a message for  regarding his appt with . I stated to give us a call back regarding this matter

## 2020-05-14 NOTE — TELEPHONE ENCOUNTER
----- Message from Dasia Coffman MA sent at 5/4/2020  3:29 PM CDT -----  Contact: pt @ [phone]      ----- Message -----  From: Karina Ruiz  Sent: 5/4/2020   1:38 PM CDT  To: [facility] Staff    Calling to speak with someone in Dr. Trinidad's office regarding his appt 5/22. Please call.

## 2020-05-21 ENCOUNTER — TELEPHONE (OUTPATIENT)
Dept: NEUROLOGY | Facility: CLINIC | Age: 70
End: 2020-05-21

## 2020-05-21 NOTE — TELEPHONE ENCOUNTER
----- Message from Marta Carrion sent at 5/21/2020  3:19 PM CDT -----  Contact: PT   PT was returning a missed call asking if his visit tomorrow could be switched to a virtual appointment but the PT said he rather come in and meet with the doctor since it's his first visit with her.     Callback: [phone]

## 2020-05-21 NOTE — TELEPHONE ENCOUNTER
Called and left a message for  regarding his appt with  on tomorrow. I stated if possible if we brigido change his appt to a virtual visit through the [facility] appt. I stated to give us a call back

## 2020-05-22 ENCOUNTER — TELEPHONE (OUTPATIENT)
Dept: NEUROLOGY | Facility: CLINIC | Age: 70
End: 2020-05-22

## 2020-05-22 ENCOUNTER — PATIENT MESSAGE (OUTPATIENT)
Dept: NEUROLOGY | Facility: CLINIC | Age: 70
End: 2020-05-22

## 2020-05-22 ENCOUNTER — OFFICE VISIT (OUTPATIENT)
Dept: NEUROLOGY | Facility: CLINIC | Age: 70
End: 2020-05-22
Payer: MEDICARE

## 2020-05-22 VITALS
BODY MASS INDEX: 29.8 KG/M2 | SYSTOLIC BLOOD PRESSURE: 140 MMHG | WEIGHT: 224.88 LBS | HEIGHT: 73 IN | DIASTOLIC BLOOD PRESSURE: 90 MMHG | HEART RATE: 81 BPM

## 2020-05-22 DIAGNOSIS — E61.1 IRON DEFICIENCY: ICD-10-CM

## 2020-05-22 DIAGNOSIS — G25.81 RLS (RESTLESS LEGS SYNDROME): ICD-10-CM

## 2020-05-22 DIAGNOSIS — G21.8 OTHER SECONDARY PARKINSONISM: ICD-10-CM

## 2020-05-22 DIAGNOSIS — Z86.69 HISTORY OF MYASTHENIA GRAVIS: ICD-10-CM

## 2020-05-22 DIAGNOSIS — G62.9 NEUROPATHY: ICD-10-CM

## 2020-05-22 DIAGNOSIS — G47.52 DREAM ENACTMENT BEHAVIOR: ICD-10-CM

## 2020-05-22 DIAGNOSIS — G47.33 OSA (OBSTRUCTIVE SLEEP APNEA): ICD-10-CM

## 2020-05-22 DIAGNOSIS — E83.59 OTHER DISORDERS OF CALCIUM METABOLISM: ICD-10-CM

## 2020-05-22 DIAGNOSIS — R25.1 TREMOR: ICD-10-CM

## 2020-05-22 DIAGNOSIS — R47.89 WORD FINDING DIFFICULTY: ICD-10-CM

## 2020-05-22 DIAGNOSIS — F48.2 PSEUDOBULBAR AFFECT: ICD-10-CM

## 2020-05-22 DIAGNOSIS — G25.9 EXTRAPYRAMIDAL AND MOVEMENT DISORDER, UNSPECIFIED: ICD-10-CM

## 2020-05-22 PROBLEM — G21.9 SECONDARY PARKINSONISM: Status: ACTIVE | Noted: 2020-05-22

## 2020-05-22 PROCEDURE — 1126F PR PAIN SEVERITY QUANTIFIED, NO PAIN PRESENT: ICD-10-PCS | Mod: HCNC,S$GLB,, | Performed by: PSYCHIATRY & NEUROLOGY

## 2020-05-22 PROCEDURE — 1101F PR PT FALLS ASSESS DOC 0-1 FALLS W/OUT INJ PAST YR: ICD-10-PCS | Mod: HCNC,CPTII,S$GLB, | Performed by: PSYCHIATRY & NEUROLOGY

## 2020-05-22 PROCEDURE — 99205 OFFICE O/P NEW HI 60 MIN: CPT | Mod: HCNC,S$GLB,, | Performed by: PSYCHIATRY & NEUROLOGY

## 2020-05-22 PROCEDURE — 99205 PR OFFICE/OUTPT VISIT, NEW, LEVL V, 60-74 MIN: ICD-10-PCS | Mod: HCNC,S$GLB,, | Performed by: PSYCHIATRY & NEUROLOGY

## 2020-05-22 PROCEDURE — 1126F AMNT PAIN NOTED NONE PRSNT: CPT | Mod: HCNC,S$GLB,, | Performed by: PSYCHIATRY & NEUROLOGY

## 2020-05-22 PROCEDURE — 99999 PR PBB SHADOW E&M-EST. PATIENT-LVL III: CPT | Mod: PBBFAC,HCNC,, | Performed by: PSYCHIATRY & NEUROLOGY

## 2020-05-22 PROCEDURE — 99499 RISK ADDL DX/OHS AUDIT: ICD-10-PCS | Mod: HCNC,S$GLB,, | Performed by: PSYCHIATRY & NEUROLOGY

## 2020-05-22 PROCEDURE — 99999 PR PBB SHADOW E&M-EST. PATIENT-LVL III: ICD-10-PCS | Mod: PBBFAC,HCNC,, | Performed by: PSYCHIATRY & NEUROLOGY

## 2020-05-22 PROCEDURE — 99499 UNLISTED E&M SERVICE: CPT | Mod: HCNC,S$GLB,, | Performed by: PSYCHIATRY & NEUROLOGY

## 2020-05-22 PROCEDURE — 1101F PT FALLS ASSESS-DOCD LE1/YR: CPT | Mod: HCNC,CPTII,S$GLB, | Performed by: PSYCHIATRY & NEUROLOGY

## 2020-05-22 PROCEDURE — 1159F PR MEDICATION LIST DOCUMENTED IN MEDICAL RECORD: ICD-10-PCS | Mod: HCNC,S$GLB,, | Performed by: PSYCHIATRY & NEUROLOGY

## 2020-05-22 PROCEDURE — 1159F MED LIST DOCD IN RCRD: CPT | Mod: HCNC,S$GLB,, | Performed by: PSYCHIATRY & NEUROLOGY

## 2020-05-22 NOTE — LETTER
May 22, 2020      Aj Patrick MD  [address] [place] [id number]           [facility]  [address]  [facility] [address]  Phone: [phone]  Fax: [phone]          Patient: Jose Rodríguez   MR Number: [id number]   YOB: 1950   Date of Visit: 5/22/2020       Dear Dr. Aj Patrick:    Thank you for referring Jose Rodríguez to me for evaluation. Attached you will find relevant portions of my assessment and plan of care.    If you have questions, please do not hesitate to call me. I look forward to following Jose Rodríguez along with you.    Sincerely,    Anaya Trinidad MD    Enclosure  CC:  No Recipients    If you would like to receive this communication electronically, please contact [email or web address] or [phone] to request more information on [employer] access.    For providers and/or their staff who would like to refer a patient to [facility], please contact us through our one-stop-shop provider referral line, [facility], at [phone].    If you feel you have received this communication in error or would no longer like to receive these types of communications, please e-mail [email or web address]

## 2020-05-22 NOTE — ASSESSMENT & PLAN NOTE
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  Dad had a tremor all his life but also drank coffee and smoked all day.   at 78.  5 unaffected siblings.    I.e, definitely NOT idiopathic Parkinson's Disease, but ?  SCA?    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    I've sent him for some bloodwork today and will also get neuropsych testing, but have deferred on any genetic testing until I put everything together.  I'm sure there must be 1 or 2 unifying neurologic disorders to his current and prior diagnoses.    "

## 2020-05-22 NOTE — LETTER
"May 22, 2020        Lucien Castro MD  [address] [facility] [address]             [facility]  [address]  [facility] [address]  Phone: [phone]  Fax: [phone]   Patient: Jose Rodríguez   MR Number: [id number]   YOB: 1950   Date of Visit: 5/22/2020     Dear Dr. Castro,     You will be seeing this gentleman next week and I'm looking forward to hearing your thoughts (he is being referred to you for neuropathy).    As I'm sure there must be 1 or 2 unifying neurologic disorders to his current and prior diagnoses.  I am seeing him for a kings-scan positive parkinsonian disorder, but his tremor is dystonic, he is highly labile, has significant RBD (ie, definitely NOT idiopathic Parkinson's Disease, but I suspect perhaps an SCA).      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    I've sent him for some bloodwork today and will also get neuropsych testing, but have deferred on any genetic testing until I put everything together.    Hope all is well,  Sincerely,        Anaya Trinidad MD  Cell: [phone]          CC  No Recipients    Enclosure         "

## 2020-05-22 NOTE — PATIENT INSTRUCTIONS
Please get me a hard copy of the MRI brain.        Test sinemet (carbidopa-levodopa) to see if it works on your tremor, word-finding as well as your legs (make them move better when you start walking).    Take one pill about 4 hours apart, 2 times a day.  Example:  Take one at 8am, 12pm.  Do this for 3-4 days.  If you get nauseated, please stop taking it.  If you feel it HELPS your tremor or your walking, please let me know by [employer] message.  If you cannot tell any difference, then let me know (and stop taking it).

## 2020-05-22 NOTE — PROGRESS NOTES
Jose Rodríguez I. Chief Complaints during this visit:  New Patient visit for  pd    Aj Patrick MD  [address] [place] [address]    Primary Care Physician  Aj Patrick MD  [address] [facility] [address]        History of present illness:   70 y.o.  male seen in consultation at the request of  Dr. Akins for evaluation of Parkinsonism and positive Paul Scan.  Patient of Ramandeep.  Accompanied by wife.    Has had tremors in hands since 20's or 30's, but in the past year, his handwriting has become disabling (psychotherapist and having trouble writing).  Leaves letters out, even writes over his own words.  Feels that half pill of sinemet reduces tremors, but clarifies that he has never had bad tremors.      Hallucinations at night, when awakens.  Drags feet, stooped posture.  Long-term loss of smell.  Stuttering at times.  Word-finding is more difficult.  Extremely labile emotions, but denies anxiety or depression.    2017 episode of delirium after taking tamiflu.  Spend week in hospital.  Infectious w/u neg.  However has not been cognitively the same since per wife.  Was shuffling, but thought it was his knees.    +neuropathy, feet, even had nerve biopsy, sees Dr. Castro next week for consult.    1989- myasthenia gravis, present with eye drooping and couldn't paint; s/p thymectomy.  Gets tired with repetitive motions, but hasn't need therapy since then.    Father had tremors.  5 siblings without symptoms.  Son has ataxia, but from a childhood brain tumor.      II.  Review of systems:  As in HPI,  otherwise, balance 10 systems reviewed and are negative.    III.  Past Medical History:   Diagnosis Date    Anxiety     Depression     Melanoma     Myasthenia gravis      Family History   Problem Relation Age of Onset    COPD Mother     Cancer Father         liver     Social history:  , psychotherapist      Current Outpatient Medications on File Prior to Visit   Medication Sig Dispense  "Refill    carbidopa-levodopa  mg (SINEMET)  mg per tablet Take 0.5 tablets by mouth 2 (two) times daily.       cetirizine (ZYRTEC) 10 MG tablet Take 10 mg by mouth once daily.      citalopram (CELEXA) 10 MG tablet Take 10 mg by mouth.      clonazePAM (KLONOPIN) 0.5 MG tablet Take 0.5 mg by mouth once daily.       cyanocobalamin (VITAMIN B-12) 1000 MCG tablet Take 100 mcg by mouth once daily.      famciclovir (FAMVIR) 500 MG tablet famciclovir 500 mg tablet   TAKE 1 TABLET BY MOUTH EVERY DAY      sildenafil (VIAGRA) 100 MG tablet Take 1 tablet (100 mg total) by mouth daily as needed for Erectile Dysfunction. 30 tablet 1    sildenafil (REVATIO) 20 mg Tab Take 3 to 5 pills each pm as needed for ed (Patient not taking: Reported on 5/22/2020) 30 tablet 2    triamcinolone (NASACORT) 55 mcg nasal inhaler nasacort allergy 24 hour gener       No current facility-administered medications on file prior to visit.        PRIOR problem-specific medications tried:  Sinemet (half pill bid)    Review of patient's allergies indicates:   Allergen Reactions    Oseltamivir      Other reaction(s): Confusion    Vancomycin Rash       IV. Physical Exam    Vitals:    05/22/20 0855   BP: (!) 140/90   Pulse: 81   Weight: 102 kg (224 lb 13.9 oz)   Height: 6' 1" (1.854 m)     General appearance: Well nourished, well developed, no acute distress.         Cardiovascular:  pedal pulses 2, no edema or cyanosis, heart regular rate and rhythym, no carotid bruits.         -------------------------------------------------------------  Facial Expression: normal       Affect: full, labile (cries easily)       Orientation to time & place:  Oriented to time, place, person and situation       Attention & concentration:  Normal attention span and concentration       Memory:  Recent and remote memory intact  Language: Spontaneous, fluent; able to repeat and name objects        Fund of knowledge:  Aware of current events        Speech:  " normal (not dysarthric)  -------------------------------------------------------  Cranial nerves: wears glasses, visual fields full, optic discs not visualized, pupils equal round and reactive, extraocular movements intact,       facial sensation intact, face symmetrical, hearing intact to whisper, palate raises midline, shoulder shrug strength normal, tongue protrudes midline.        -------------------------------------------------------  Musculoskeletal  Muscle tone: all 4 extremities normal        Muscle Bulk: all 4 extremities normal        Muscle strength:  5/5 in all 4 extremities     Toes curled under bilaterally.       No pronator drift  Sensation: reduced light touch (40%) in feet to knees, reduced vibration to ankles      Deep tendon Reflexes: 2 bilateral biceps, triceps, absent patella and ankles; toes mute     --------------------------------------------------------------  Cerebellar and Coordination  Gait:  Normal except for decreased armswing.  May have slightly low step height, but not far out of range of normal for me.        Finger-nose: no dysmetria       Rapid Alternating Movements (pronation/supination):  R normal; L very slight dec in amplitude  --------------------------------------------------------------  Abnormality of movement (bradykinesia, hyperkinesia) present? YES:  Bradykinetic: slight global slowness or poverty of movement, could be normal   Tremor present?   Yes, bilaterally he has irregular, jerky tremor of fingers and a diffuse, palpable tremor/jerkiness of upper body.  No resting tremor.  +writer's cramp dystonic tremor (arm tends to pronate as he is writing).  Very tiny writing.  Posture:  normal  Postural stability:  no Rhomberg            V.  Laboratory/ Radiological Data: (Personally reviewed images)       MRI brain 2020:    Indication: G20, G60.3 Parkinson's disease; Idiopathic progressive neuropathy  Comparison: January 27, 2017.  T1, T2, gradient echo, FLAIR and diffusion  "imaging of the brain was performed in sagittal, coronal and axial planes. There are a few subcortical periventricular. There are a few subcortical and periventricular high T2 signal lesions in the white matter both cerebral hemispheres, nonspecific finding which may be idiopathic or related to chronic ischemic disease. Comparison exam is compromised by motion. No hemorrhage, mass lesion, mass effect or abnormal fluid collection. Diffusion imaging demonstrates no acute infarct. The carotid and basilar arteries are patent. The pituitary and orbits are normal. The paranasal sinuses are clear.   Impression: Bilateral cerebral white matter lesions, idiopathic versus chronic ischemic disease. Otherwise normal.      Kings 2019- diminished uptake with the putamen bilaterally  MRI 2017- report normal      VI. Assessment and Plan        Problem List Items Addressed This Visit        1 - High    Secondary parkinsonism    Overview     2017 decreased step height, RBD, PBA, worsening of underling dystonic tremor.  +Kings scan (bilateral and symmetric).  Dystonic toes (b).         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  Dad had a tremor all his life but also drank coffee and smoked all day.   at 78.  5 unaffected siblings.    I.e, definitely NOT idiopathic Parkinson's Disease, but ?  SCA?    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    I've sent him for some bloodwork today and will also get neuropsych testing, but have deferred on any genetic testing until I put everything together.  I'm sure there must be 1 or 2 unifying neurologic disorders to his current and prior diagnoses.           Relevant Orders    Ceruloplasmin    Calcium (Completed)    PTH, intact    Vitamin D (Completed)    Angiotensin converting enzyme    Tremor    Overview     Action tremor since 20's or 30's.         Relevant Orders    TSH       2     Pseudobulbar affect    Word finding difficulty    Relevant Orders    Vitamin B12    Vitamin B1    TSH    Ambulatory referral/consult to Neuropsychology       3     Dream enactment behavior    RLS (restless legs syndrome)    Relevant Orders    IRON AND TIBC (Completed)    PROMISE (obstructive sleep apnea)    Overview     Unable to tolerate cpap due to recurrent infections.            4     Neuropathy    Overview     Sensory loss in feet to knees.         Relevant Orders    Vitamin B12    Vitamin B1    TSH    Comprehensive metabolic panel (Completed)    CBC auto differential    Methylmalonic Acid, Serum    PROTEIN ELECTROPHORESIS, SERUM (Completed)       5     History of myasthenia gravis      Other Visit Diagnoses     Extrapyramidal and movement disorder, unspecified         Relevant Orders    Vitamin B12    Other disorders of calcium metabolism         Relevant Orders    Vitamin D (Completed)    Iron deficiency         Relevant Orders    IRON AND TIBC (Completed)                Follow up in about 3 months (around 8/22/2020).             ___________________________________________________________    I appreciate the opportunity to participate in the care of this patient and will communicate my assessment and plan back to the referring physician via copy of this note.

## 2020-05-28 ENCOUNTER — PATIENT MESSAGE (OUTPATIENT)
Dept: NEUROLOGY | Facility: CLINIC | Age: 70
End: 2020-05-28

## 2020-05-29 ENCOUNTER — PATIENT MESSAGE (OUTPATIENT)
Dept: NEUROLOGY | Facility: CLINIC | Age: 70
End: 2020-05-29

## 2020-05-29 ENCOUNTER — OFFICE VISIT (OUTPATIENT)
Dept: NEUROLOGY | Facility: CLINIC | Age: 70
End: 2020-05-29
Payer: MEDICARE

## 2020-05-29 DIAGNOSIS — Z87.898 HISTORY OF DELIRIUM: ICD-10-CM

## 2020-05-29 DIAGNOSIS — G21.8 OTHER SECONDARY PARKINSONISM: ICD-10-CM

## 2020-05-29 DIAGNOSIS — G47.33 OSA (OBSTRUCTIVE SLEEP APNEA): ICD-10-CM

## 2020-05-29 DIAGNOSIS — G47.52 DREAM ENACTMENT BEHAVIOR: ICD-10-CM

## 2020-05-29 DIAGNOSIS — F48.2 PSEUDOBULBAR AFFECT: ICD-10-CM

## 2020-05-29 DIAGNOSIS — F40.10 SOCIAL ANXIETY DISORDER: Primary | ICD-10-CM

## 2020-05-29 DIAGNOSIS — R47.89 WORD FINDING DIFFICULTY: ICD-10-CM

## 2020-05-29 PROCEDURE — 90791 PSYCH DIAGNOSTIC EVALUATION: CPT | Mod: HCNC,95,, | Performed by: PSYCHIATRY & NEUROLOGY

## 2020-05-29 PROCEDURE — 99499 NO LOS: ICD-10-PCS | Mod: HCNC,95,, | Performed by: PSYCHIATRY & NEUROLOGY

## 2020-05-29 PROCEDURE — 90791 PR PSYCHIATRIC DIAGNOSTIC EVALUATION: ICD-10-PCS | Mod: HCNC,95,, | Performed by: PSYCHIATRY & NEUROLOGY

## 2020-05-29 PROCEDURE — 99499 UNLISTED E&M SERVICE: CPT | Mod: HCNC,95,, | Performed by: PSYCHIATRY & NEUROLOGY

## 2020-05-29 NOTE — PROGRESS NOTES
NEUROPSYCHOLOGY TELEHEALTH INTERVIEW    Referral Information  Name: Jose Rodríguez  MRN: [id number]  Age: 70 y.o.    : 1950  Race: White    Handedness: Right  Gender: male    Education: 18 years       FRAGA: 2020  Referring Provider: Anaya Trinidad MD    Referral Reason/Medical Necessity: Mr. Rodríguez has a history of BUE tremor, PROMISE, dream enactment, and social anxiety. He has been experiencing worsening word finding since an episode of delirium in 2017. Neuropsychological evaluation was requested to assess current cognitive functioning, aid in differential diagnosis, and provide treatment recommendations.    Consent: The patient expressed an understanding of the purpose of the evaluation and consented to all procedures.  Procedures: Chart review, clinical interview, report-writing, brief clinical feedback.  Billing: Please see billing table at the end of this report.  Telemedicine:   The patient location is: [place], [place]  The provider location is: [place], [place]   The chief complaint leading to consultation/medical necessity is: word finding difficulty   Visit type: Virtual visit with synchronous audio and video   Total time spent with patient: 82 min  Each patient to whom I provide medical services by telemedicine is:  (1) informed of the relationship between the physician and patient and the respective role of any other health care provider with respect to management of the patient; and (2) notified that he or she may decline to receive medical services by telemedicine and may withdraw from such care at any time.  Consent/Emergency Plan: The patient expressed an understanding of the purpose of the evaluation and consented to all procedures. I informed the patient of limits to confidentiality and discussed an emergency plan.      ASSESSMENT & PLAN   Results from the interview indicate the following diagnoses and treatment plan recommendations. This was discussed with patient/family on 20. The patient  can follow a treatment plan without help from family.    Diagnoses  Problem List Items Addressed This Visit        Neuro    Word finding difficulty  Mr. Rodríguez reports longstanding anxiety-driven word-finding difficulty that has been worsening since an episode of delirium in 2017. Symptoms occur primarily in unstructured social settings, and do not occur at work. He doesn't report other cognitive symptoms immediately consistent with typical parkinsonian profiles, although word-finding could be at least partially driven by slowed processing speed.    Regarding his 2017 episode of delirium, I'm sure the delirium had a significant impact on his thinking at the time, and likely took several months to resolve, but I wouldn't expect continued contribution from one episode of delirium nearly 3 years out. His wife suspects he had a stroke in 2017, but stroke workup was reportedly negative at the time. Outside MRI was recently dropped off with Neurology, but records are not currently available for review.     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 He does not qualify for a dementia diagnosis, but we will bring him in for testing to rule out possible MCI.    Suspect contribution from poor sleep, anxiety, possible depression. Possible contribution from underlying movement disorder.    PLAN:   1. Pt agreeable to returning for neuropsychological testing  2. Compensatory cognitive strategies provided in AVS       Other    PROMISE (obstructive sleep apnea)    Overview     Unable to tolerate cpap due to recurrent respiratory infections, which he attributes to CPAP.  Suspect this is exacerbating his cognitive complaints.  Pt reports lifelong (?) dream enactment with hypnopompic hallucainations. I am not sure how much of this could be related to PROMISE, or could be worked out with a repeat sleep study. I can't find documentation for his old sleep study, he estimates 3 to 4 yrs ago. Defer to  sleep and neurology colleagues re: the appropriateness of a sleep study referral.     PLAN:  1. Pt is willing to reconsider CPAP use, would meet with Sleep Clinic to explore mask/cleaning options. Recommend referral.   2. Sleep hygiene strategies provided in AVS         Other Visit Diagnoses     Social anxiety disorder    -  Primary  Appears longstanding. Emotional swings may be related to possible PBA, but I'd like him to get treatment for the social anxiety and phase of life issues we discussed today and see how much that improves his symptoms. Pt is agreeable.     PLAN:  1. Pt will get in contact with his old therapist and initiate treatment  2. Anxiety reduction strategies provided in AVS          Thank you for allowing me to participate in Mr. Rodríguez's care.  If you have any questions, please contact me.    Marlin Howell Psy.D.  Licensed Clinical Neuropsychologist  [facility] - Department of Neurology        HISTORY OF PRESENT ILLNESS   Mr. Rodríguez is here for evaluation of cognitive complaints in the context of positive Paul scan and parkinsonism.  Per recent neurology note, pt reports he has had tremors in hands since 20's or 30's, but in the past year, his handwriting has become disabling (psychotherapist and having trouble writing).  Leaves letters out, even writes over his own words. Feels that half pill of sinemet reduces tremors, but clarifies that he has never had bad tremors. He also endorses: hallucinations at night, when awakened, drags feet, stooped posture, long-term loss of smell, stuttering at times, word-finding is more difficult. extremely labile emotions, but denies anxiety or depression. He was diagnosed with myasthenia gravis in 1989, present with eye drooping and couldn't paint; s/p thymectomy.  Gets tired with repetitive motions, but hasn't need therapy since then. Father had tremors.  5 siblings without symptoms. Son has ataxia, but from a childhood brain tumor.    Pt had an episode  "of delirium in 2017 after taking Tamiflu - pt states he may have had serotonin syndrome - wife thought he had a stroke, but imaging was clear. He spent a week in the hospital. Infectious workup was negative. Since then, he has had word-finding issues. This happens more often in spontaneous conversations, although this doesn't come up at work. He isn't sure if this is because he feels like an expert, has been doing it for 35 years. But in social situations, he tends to experience it more. He has always been quiet and not a center of attention. He has always tended to speak quickly, and feel anxious in social situations, sometimes will stumble over his words, which makes him feel "dumb." This is stressful. This has contributed to him socializing less. He will run through a conversation in his head before he has it.     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     Cognitive Sxs:  · Attention: Denied. Still reads every night before bed, no issues.   · Mental Speed: Not sure if he's thinking more slowly, but does states he does move and react slower.   · Memory: Recall is pretty good. Not forgetful, not misplacing things. Remembers clients without having to re-read charts.   · Language: Endorses word-finding difficulty. Has been present since his early 30's, but has been worse since 2017, continuing to get worse. No paraphasias. Receptive language is intact. Word finding is worse in unstructured social situations.   · Visuospatial/Perceptual: No changes to sense of direction. Still goes to the woods a lot and doesn't get lost.   · Executive Functioning: No difficulty with planning, organization, multitasking, problem solving, etc. No impulsivity, no one is worried about his judgment. 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 He is having more emotional swings, he cries easily " every day in any situation that is emotional or intense (a sad movie or story, other emotional situation). Not overly emotional in patient sessions. Not having inappropriate laughing. Typically episodes of sadness are associated with an emotional stimulus, they don't occur out of the blue. He does feel more self-reflective recently given his age, feels that this sets him up to be more emotional.     Onset/Course: Mr. Sanchez symptoms were gradual in onset in his early 30's (word finding) and are worsening, particularly after his 2017 episode of delirium. Word finding is worse in unstructured social situations, better in structured settings (work as a therapist, hobby work as civil war re-enactor). The pt denies any exacerbating or ameliorating factors. No waxing/waning of cognitive status.    Neuropsychiatric Sxs:  Pt had some depression when his son was diagnosed with a brain tumor at age 2 and age 17. He has had anxiety and depression over the years, has been on antidepressants. He has been in therapy to explore these issues. He was last in formal MH treatment about 15-20 years. Never been hospitalized. No suicide attempts.     · Self-Described Mood: apprehensive recently. Anxiety has seemed higher recently. He was anxious about today's appointment.   · Depressed Mood: Denied demetri depressed mood and anhedonia. He takes care of himself, continues to participate in hobbies.   · Anxiety: Endorsed increased anxiety recently over his health. He has read about DLB and is worried about dementia.  Denies generalized anxiety. He has always been more anxious in social situations, agrees that he has flavors of social anxiety disorder. He has a stress-related spastic colon, this has contributed a lot to social anxiety. He has always had word-finding and pressured speech in social situations. Rehearses important conversations ahead of time. He had one panic attack in graduate school but none since. Other than GI issues, does  not tend to experience physical symptoms of stress.   · Stress: His own health; wife retired recently, they are getting to spend more time together recently.   · SI/HI: Denied   · Neurovegetative:  · Sleep: Was decreased until he began taking Klonopin (started January 2020). He was waking up frequently and having hallucinations (seeing snakes in the bed, elephant in the other room). He has been sleep walking occasionally, talking in his sleep. Pt reports that he has ALWAYS had active dreams, hallucinations, acting out dreams. He is not sure if this is related to untreated PROMISE. In the past, when his anxiety was worse, he had difficulty falling asleep due to rumination and anxiety, but this has not happened for years. If something is bothering him, he occasionally wakes up earlier than usual.   · Total Hours/Night: 7 to 8   · Pattern:falls asleep easily, has restless sleep, has frequent nighttime awakenings and is not rested upon awakening   · PROMISE: Yes, CPAP noncompliant due to getting repeated respiratory infections, which he attributes to the CPAP. Has not tried CPAP for 3-4 years. Had difficulty keeping up with cleaning it daily. He has lost weight, which he thinks has helped. Wife says he still snores, although less than before. Wife does not notice apneic events. He does have daytime sleepiness, will doze off when sedentary.   · Nightmares: Denied   · Acting out dreams: Endorsed, longstanding  · Daytime Naps: Denied, although he is sleepy enough that he could nap if he had time.  · Appetite: normal. Will do some nervous eating.   · Energy: decreased, finds that he is less active than he used to be.    · Delusional/Paranoid Thinking: Denied  · Hallucinations: Endorsed hypnopompic hallucinations, wakes frequently during the night. No hallucinations during the day.  · Impulsive/Compulsive Behaviors: Denied  · Disinhibition: Denied  · Apathy: Denied  · Irritability/Agitation: Some increased frustration at work, with  his symptpms    Physical  · Motor:  BUE intention tremor for most of his life.   · Gait:  Minimal changes per last neuro exam. Wife states he shuffles and takes smaller steps and is stooped. Pt attributes much of this to orthopedic surgeries. He has hammer toes, can only move his big toes.   · Sensory: No major changes to hearing or vision. Wears reading glasses. Has longstanding loss of sense of smell, which he attributes to use of nasal allergy sprays. He has neuropathy in his right foot and leg.   · Pain: Mr. Rodríguez described current pain at a 0 on a scale of 1-10, with 10 being worst. 0    Current Functioning (I/ADLs):  · ADLs: independent   · IADLs:  · Finances: independent   · Medication Mgmt:independent   · Driving: independent. Wife has always thought he drives fast.   · Household Mgmt: independent      RELEVANT HISTORY:  Prior Testing:  None    Neurologic History  · Seizures: None  · Stroke: None. Wife was surgical nurse for 30 years, believes he had a stroke during delirium episode, but neuroimaging and stroke workup was normal.   · TBI: Denied  · Movement Disorder: Yes  · CNS infection: Denied  · Brain tumor: Denied  · Other neurologic history: Myasthenia gravis (s/p thymectomy)  · Dysautonomia: Urinary urgency, chronic constipation   · Referral Diagnosis: R47.89 (ICD-10-CM) - Word finding difficulty    Neurodiagnostics:   None available in our system   Positive Nando Scan, per record review    Substance Use History:  Social History     Tobacco Use    Smoking status: Never Smoker    Smokeless tobacco: Never Used   Substance and Sexual Activity    Alcohol use: No    Drug use: Not Currently. Tried marijuana and LSD in college.     Sexual activity: Not on file   · Caffeine          1 cup coffee per day, one glass of diet iced tea.     Medical history  Patient Active Problem List   Diagnosis    Screening for colorectal cancer    Secondary parkinsonism    Tremor    Dream enactment behavior    Word  finding difficulty    History of myasthenia gravis    Neuropathy    RLS (restless legs syndrome)    PROMISE (obstructive sleep apnea)    Pseudobulbar affect     Past Medical History:   Diagnosis Date    Anxiety     Depression     Melanoma     Myasthenia gravis      Past Surgical History:   Procedure Laterality Date    COLONOSCOPY N/A 5/20/2016    Procedure: COLONOSCOPY-Miralax prep;  Surgeon: Lucien Rader Jr., MD;  [facility];  Service: Endoscopy;  Laterality: N/A;    HERNIA REPAIR      inguinal double    KNEE ARTHROSCOPY W/ MENISCAL REPAIR Right     LAMINECTOMY  03/2019    mya      SKIN SURGERY      melanoma removal    SPINE SURGERY      L5-S1    THYMECTOMY      TOTAL KNEE ARTHROPLASTY Left        Pertinent Labs:  Lab Results   Component Value Date    MILWNVSZ76 621 05/22/2020     No results found for: RPR  No results found for: FOLATE  Lab Results   Component Value Date    TSH 1.822 05/22/2020     No results found for: LABA1C, HGBA1C  No results found for: HIV1X2, GZL82GTIX      Current Outpatient Medications:     carbidopa-levodopa  mg (SINEMET)  mg per tablet, Take 0.5 tablets by mouth 2 (two) times daily. , Disp: , Rfl:     cetirizine (ZYRTEC) 10 MG tablet, Take 10 mg by mouth once daily., Disp: , Rfl:     citalopram (CELEXA) 10 MG tablet, Take 10 mg by mouth., Disp: , Rfl:     clonazePAM (KLONOPIN) 0.5 MG tablet, Take 0.5 mg by mouth once daily. , Disp: , Rfl:     cyanocobalamin (VITAMIN B-12) 1000 MCG tablet, Take 100 mcg by mouth once daily., Disp: , Rfl:     famciclovir (FAMVIR) 500 MG tablet, famciclovir 500 mg tablet  TAKE 1 TABLET BY MOUTH EVERY DAY, Disp: , Rfl:     sildenafil (REVATIO) 20 mg Tab, Take 3 to 5 pills each pm as needed for ed (Patient not taking: Reported on 5/22/2020), Disp: 30 tablet, Rfl: 2    sildenafil (VIAGRA) 100 MG tablet, Take 1 tablet (100 mg total) by mouth daily as needed for Erectile Dysfunction., Disp: 30 tablet, Rfl: 1     "triamcinolone (NASACORT) 55 mcg nasal inhaler, nasacort allergy 24 hour gener, Disp: , Rfl:     Family History:  Family History   Problem Relation Age of Onset    COPD Mother     Cancer Father         liver     Family Neurologic History: Father had tremor. Great grandfather had PD. No dementia that he knows of. Strong family history of multiple types of cancer.   Family Psychiatric History: Negative for heritable risk factors     Developmental/Academic Hx:  Developmental: Born and raised in [place], [place]. No gestational or later developmental concerns. Product of a normal pregnancy and delivery. Mother told him he hit developmental milestones early. No history of abuse, though parents were not always supportive of him. Middle of 6 children, some low self-esteem, got "lost in the mix." Lots of conflict between his parents growing up. Always a sensitive kid.  Academic:  · Learning Difficulties: Elementary school was a struggle (attributes this to home life), did better in high school, was on the Matt's list in college.   · Attention Difficulties: Daydreamed frequently, would fall behind in math and reading. Didn't raise his hand if he needed help. Suspects he had elements of social anxiety as a kid as well.   · Behavioral Difficulties: Denied formal discipline at school, but was often in trouble at home  · Educational Attainment: 18 - LCSW    Social/Occupational Hx:     Occupational Hx:  · Occupational Status: Employed full time   · Primary Occupation(s): Psychotherapist, LCSW       Social:  · Resides: in [place] with wife   · Current Relationship/Family Status:  x1,  to current wife 38 years. Has 1 biological son and 1 stepdaughter. Good relationships with wife and kids.   · Primary Source of Support: wife. Pt states he has a good social support network.   · Leisure Activities: Civil war re-enactment, collecting treasure to re-sell.       MENTAL STATUS AND BEHAVIORAL " OBSERVATIONS:  Appearance:  Casually dressed and Well groomed  Behavior:   calm, cooperative and rapport easily established  Orientation:   Fully oriented  Sensory:   Hearing and vision appeared adequate for testing purposes.  Gait:   Not observed (virtual/telephone visit)   Psychomotor:  Within Normal Limits  Speech:  Fluent and spontaneous. Normal volume, rate, pitch, tone, and prosody.  Language:  Receptive and expressive language appear intact., No evidence of word-finding difficulties in conversational speech.  Mood:   Anxious and depressed  Affect:   Occasionally tearful, otherwise mood-congruent  Thought Process: goal directed, linear and within normal limits  Thought Content: WNL, Denied current SI/HI. and No evidence of psychotic symptoms.  Memory:  Recent and remote appear grossly intact  Attn/Concentration:  Grossly intact  Fund of Knowledge: Broadly WNL  Judgment/Insight: Grossly intact      GAD7 5/29/2020   1. Feeling nervous, anxious, or on edge? 0   2. Not being able to stop or control worrying? 0   3. Worrying too much about different things? 0   4. Trouble relaxing? 0   5. Being so restless that it is hard to sit still? 0   6. Becoming easily annoyed or irritable? 0   7. Feeling afraid as if something awful might happen? 0   LIZBETH-7 Score 0       PHQ9 5/29/2020   Total Score 0     NPIQ RFS 5/29/2020   WHO IS FILLING OUT FORM? PWD   Does this patient have false beliefs, such as thinking that others are stealing from him/her or planning to harm him/her in some way? No   Does this patient have hallucinations such as false visions or voices? [place] she/he seem to hear or see things that are not present? No   Is the patient resistive to help from others at times, or hard to handle? No   Does the patient seem sad or say that he/she is depressed? Yes   Depression/Dysphoria Severity 1   Depression/Dysphoria Distress 1   Does the patient become upset when  from you? Does he/she have any other signs of  nervousness such as shortness of breath, sighing, being unable tor elax, or feeling excessively tense? No   Does the patient appear to feel good or act excessively happy? No   Does this patient seem less interested in his/her usual activities or in the activities and plans of others? No   Does this patient seem to act cumpolsively, for example, talking to strangers as if she/he knows them, or saying things that may hurt people's feelings? No   Is the patient impatient and cranky? Does he/she have difficulty coping with delays or waiting for planned activities? No   Does the patient engage in repetitive activities such as pacing around the house, handling buttons, wrapping string, or doing other things repeatedly? No   Does this patient awaken you during the night, rise too early in the morning, or take excessive naps during the day? Yes   Nightime Behavior Severity 2   Nightime Behavior Distress 1   Has the patient lost or gained weight, or had a change in the type of food he/she likes? No   NPI Total Severity Score 3   NPI Total Distress Score 2         IADL 5/29/2020   Ability to Use Telephone Operates telephone on own initiative, looks up and dials numbers   Shopping Takes care of all shopping needs independently   Food Preparation Plans, prepares, and serves adequate meals independently   Housekeeping Maintains house alone with occasional assistance (heavy work)   Laundry Does personal laundry completely   Mode of Transportation Travels independently on public tranportation or drives own car   Responsibility for Own Medications Is responsible for taking medication in correct dosages at correct time   Ability to Handle Finances Manages financial matters independently (budgets, writes checks, pays rent and bills, goes to bank). Collects and keeps track of income         BILLING INFORMATION:  Billing/Services Summary          Psychiatric Diagnostic Interview Base Code (02958)  Total Units: 1    Face-to-face Total  Time: 82 min.         Neurobehavioral Status Exam Base Code (39819)   Total Units: 0 Add-on (21933)  Total Units: 0   Face-to-face 0 min.    Record Review, Integration, Report Generation 0 min.     Total Time: 0 min.    DOS is the date of the evaluation unless specified

## 2020-06-01 NOTE — PATIENT INSTRUCTIONS
Cognitive Recommendations:  · Attention: Remember that inattention and lack of focus are major culprits to forgetting information so be sure and practice paying attention for adequate learning of information. If you rely on passive attention to remembering something (e.g., yeah, uh-huh approach), youll find you cannot recall it later. I recommend the following to improve attention, which may aid in later recall:   · Reduce distractions in the area as much as possible.  · Look at the person as they are speaking to you.  · Paraphrase as they are speaking  · Write down important pieces of information   · Ask them to repeat if you zone out.   · Have them simplify and reduce information that you need to attend to during conversation.   · Have visual cues to remind you if you need to do something later.  · Processing Speed:   · Using multiple modalities (e.g., listening, writing notes, asking questions, recording) to learn new information is likely to allow additional time for processing, thus improving memory for the material.   · Allowing sufficient time to complete tasks will reduce frustration and help to ensure completion.  · Executive Functioning:  · Dont attempt to multi-task.  Separate tasks so that each can be completed one at a time.  · Consider using a calendar/day planner, as that may be effective to help you plan and stay on track.  Color-coding specific tasks by importance may add additional benefit to your planner.  · Break down large projects into smaller tasks and write down the steps to completing the task.  Taking notes while reading can help with recall.  · Storing Information: Use the below strategies to help you further enhance how information is stored.  · Rehearse - Immediately after seeing/hearing something, try to recall it.  Wait a few minutes, then check again.  Gradually lengthen the intervals between rehearsals.  · Repetition of learned material is critical to ensure storage of  information to be learned. Self-test at home to ensure learning.  · Write down important information to improve your attention and focus and to have something to look back on when you need to recall it.  · Make sure the person doesnt rattle off, but presents in a clear, logical, and unhurried manner.   · Recalling Information:  · Jog your memory - Lose something?  Think back to when you last had it.  What did you do next?  And after that?  Mentally walk yourself through each activity that followed.  Prodding your memory this way may enable you to recall the location of the missing item.  · Use a cue - Symbolic reminders (the proverbial string around the finger) are helpful.  So too are memos, timers, calendar notes, etc.--keep them in visible, appropriate places.  · Get organized - Have fixed locations for all important papers, key phone numbers, medications, keys, wallet, glasses, tools, etc.  · Develop routines - Routines can anchor memories so they do not drift away.    · Practice good cognitive hygiene:  · Engage in regular exercise, which increases alertness and arousal and can improve attention and focus.  Consider lower impact exercises, such as yoga or light walking.  · Get a good nights sleep, as this can enhance alertness and cognition.  · Eat healthy foods and balanced meals. It is notable that research indicates certain nutrients may aid in brain function, such as B vitamins (especially B6, B12, and folic acid), antioxidants (such as vitamins C and E, and beta carotene), and Omega-3 fatty acids. Talk with your physician or nutritionist about whats right for you.   · Keep your brain active. Find activities to stay mentally active, such as reading, games (cards, checkers), puzzles (crosswords, Sudoku, jig saw), crafts (models, woodworking), gardening, or participating in activities in the community.  · Stay socially engaged. Continue staying active with your family and friends.  · Monitor your mood:   "You reported symptoms of irritability; implementing the recommendations above may also help to improve mood.  If you or your loved ones notice increased symptoms, I recommend psychiatric or psychological treatment to help you more effectively manage symptoms.      Sleep Hygiene:      Avoid watching TV, eating, and discussing emotional issues in bed. The bed should be used for sleep and sex only. If not, we can associate the bed with other activities and it often becomes difficult to fall asleep.   Minimize noise, light, and temperature extremes during sleep with ear plugs, window blinds, or an electric blanket or air conditioner. Even the slightest nighttime noises or luminescent lights can disrupt the quality of your sleep. Try to keep your bedroom at a comfortable temperature -- not too hot (above 75 degrees) or too cold (below 54 degrees).   Try to go to bed and wake up at the same time every day. A strict routine can help encourage stability in your circadian rhythm. Get out of bed even if you're still tired - sleeping in much later will make it harder to fall asleep the next night, and the cycle will continue. You may need to run a "sleep deficit" for a day to help you fall asleep more easily.     Do not watch TV in bed, and do not fall asleep to TV. Many people say leaving the TV on helps them fall asleep. However, the flickering "blue light" released by screens  is absorbed even through closed eyelids, and suppresses melatonin release in your brain. This can cause us to linger in the lightest stages of sleep, and miss out on more restorative, deeper sleep. A better option might be a white noise machine or mariaa without any light.    Try not to drink fluids after 8 p.m. This may reduce awakenings due to urination.   Avoid naps, but if you do nap, make it no more than about 25 minutes about eight hours after you awake. But if you have problems falling asleep, then no naps for you.   Do not expose your self " "to bright light if you need to get up at night. Use a small night-light instead. Blue light can be particularly detrimental, including the light from your cellphone, TV, or computer screen.   Nicotine is a stimulant and should be avoided particularly near bedtime and upon night awakenings. Having a smoke before bed, although it may feel relaxing, is actually putting a stimulant into your bloodstream.   Caffeine is also a stimulant and is present in coffee (100-200 mg), soda (50-75 mg), tea (50-75 mg), and various over-the-counter medications. Caffeine should be discontinued at least four to six hours before bedtime. If you consume large amounts of caffeine and you cut your self off too quickly, beware; you may get headaches that could keep you awake. Sometimes people feel they are "immune" to the effects of caffeine, and that a warm cup of coffee before bed actually helps them relax. But similar to nicotine, despite that it may feel relaxing in the moment, you are still consuming a potent stimulant, and it will act on your nervous system throughout the night causing restless and disrupted sleep even if you don't notice feeling jittery at bedtime.    Avoid alcohol in the evenings. Although alcohol is a depressant and may help you fall asleep, the subsequent metabolism that clears it from your body when you are sleeping causes a withdrawal syndrome. This withdrawal causes awakenings and is often associated with nightmares and sweats.   A light snack may be sleep-inducing, but a heavy meal too close to bedtime interferes with sleep. Stay away from protein and stick to carbohydrates or dairy products. Milk contains the amino acid L-tryptophan, which has been shown in research to help people go to sleep. So milk and cookies or crackers (without chocolate) may be useful and taste good as well.   Be active during the day. Get regular exercise, help burn off excess energy, and promote more sound sleep at night.    Try " "to get outside into the sunlight at least once per day (with sunscreen, of course!). Your circadian rhythm is primarily regulated by the light coming through your eyes, so making sure it's fully dark at night and making sure you get some sunlight during the day can reinforce your circadian rhythm.    Use mindfulness or meditation strategies to quiet a busy mind. Many people report having difficulty sleeping due to being unable "turn off" their minds. Practicing mindfulness exercises before bed - like Progressive Muscle Relaxation or a body scan - can help promote relaxation and aid in anxiety reduction. Apps such as Attention Point and Calm can be useful, and there are many freely available mindfulness videos on [employer]. If anxiety continues to interfere with your sleep, seeing a behavioral health professional to learn anxiety reduction strategies can be helpful.    If you are still struggling with sleep, or struggling to implement any of these tips, behavioral health professionals who are specially trained in sleep medicine can be helpful. Modalities such as Cognitive Behavioral Therapy for Insomnia (CBT-I) can be particularly useful.     Anxiety Coping Strategies: Try these when you're feeling anxious or stressed:   Stress management: Limit potential triggers by managing stress levels. Keep an eye on pressures and deadlines, organize daunting tasks in to-do lists, and take enough time off from professional or educational obligations.   Take a time-out. Practice yoga, listen to music, meditate, get a massage, or learn relaxation techniques. Stepping back from the problem helps clear your head.   Eat well-balanced meals. Do not skip any meals. Do keep healthful, energy-boosting snacks on hand.   Limit alcohol and caffeine, which can aggravate anxiety and trigger panic attacks.   Get enough sleep. When stressed, your body needs additional sleep and rest.   Exercise daily: Physical exertion and an active lifestyle " can improve self-image and trigger the release of chemicals in the brain that stimulate positive emotions.   Welcome humor. A good laugh goes a long way.   Maintain a positive attitude. Make an effort to replace negative thoughts with positive ones.   Get involved. Volunteer or find another way to be active in your community, which creates a support network and gives you a break from everyday stress.   Learn what triggers your anxiety. Is it work, family, school, or something else you can identify? Write in a journal when youre feeling stressed or anxious, and look for a pattern.   Support network: Talk to a person who is supportive, such as a family member or friend. Avoid storing up and suppressing anxious feelings as this can worsen anxiety disorders.   Relaxation techniques: Certain measures can help reduce signs of anxiety, including deep-breathing exercises, long baths, meditation, yoga, and resting in the dark.   Exercises to replace negative thoughts with positive ones: Write down a list of any negative thoughts, and make another list of positive thoughts to replace them. Picturing yourself successfully facing and conquering a specific fear can also provide benefits if the anxiety symptoms link to a specific stressor.   Slow breathing. When youre anxious, your breathing becomes faster and shallower. Try deliberately slowing down your breathing. Count to three as you breathe in slowly - then count to three as you breathe out slowly.   Progressive muscle relaxation. Find a quiet location. Close your eyes and slowly tense and then relax each of your muscle groups from your toes to your head. Hold the tension for three seconds and then release quickly. This can help reduce the feelings of muscle tension that often comes with anxiety.   Stay in the present moment. Anxiety can make your thoughts live in a terrible future that hasnt happened yet. Try to bring yourself back to where you are. Practice a  grounding technique: Name 5 things you can see, 4 things you can hear, 3 things you can feel, 2 things you can smell, and 1 thing you can taste.   Healthy lifestyle. Keeping active, eating well, going out into nature, spending time with family and friends, reducing stress and doing the activities you enjoy are all effective in reducing anxiety and improving your wellbeing.      Take small acts of bravery. Avoiding what makes you anxious provides some relief in the short term, but can make you more anxious in the long term. Try approaching something that makes you anxious - even in a small way. The way through anxiety is by learning that what you fear isnt likely to happen - and if it does, youll be able to cope with it.   Challenge your self-talk. How you think affects how you feel. Anxiety can make you overestimate the danger in a situation and underestimate your ability to handle it. Try to think of different interpretations to a situation thats making you anxious, rather than jumping to the worst-case scenario. Look at the facts for and against your thought being true.   Plan worry time. Its hard to stop worrying entirely so set aside some time to indulge your worries. Even 10 minutes each evening to write them down or go over them in your head can help stop your worries from taking over at other times.   Get to know your anxiety. Keep a diary of when its at its best - and worst. Find the patterns and plan your week - or day - to proactively manage your anxiety.   Learn from others. Talking with others who also experience anxiety - or are going through something similar - can help you feel less alone.    Be kind to yourself. Remember that you are not your anxiety. You are not weak. You are not inferior. You have a mental health condition. Its called anxiety.     What is mindfulness?  Mindfulness is the basic human ability to be fully present, aware of where we are and what were doing, and not  "overly reactive or overwhelmed by whats going on around us.    While mindfulness is something we all naturally possess, its more readily available to us when we practice on a daily basis.    Whenever you bring awareness to what youre directly experiencing via your senses, or to your state of mind via your thoughts and emotions, youre being mindful. And theres growing research showing that when you train your brain to be mindful, youre actually remodeling the physical structure of your brain.    What is meditation?  Meditation is exploring. Its not a fixed destination. Your head doesnt become vacuumed free of thought, utterly undistracted. When we meditate we venture into the workings of our minds: our sensations (air blowing on our skin or a harsh smell wafting into the room), our emotions (love this, hate that, crave this, loathe that) and thoughts (wouldnt it be weird to see an elephant playing a trumpet).    Mindfulness meditation asks us to suspend judgment and unleash our natural curiosity about the workings of the mind, approaching our experience with warmth and kindness, to ourselves and others.    How do I practice mindfulness and meditation?  Mindfulness is available to us in every moment, whether through meditations and body scans, or mindful moment practices like taking time to pause and breathe when the phone rings instead of rushing to answer it. Reminding yourself to take notice of your thoughts, feelings, body sensations and the world around you is the first step to mindfulness.    Notice the everyday  "Even as we go about our daily lives, we can notice the sensations of things, the food we eat, the air moving past the body as we walk," says Professor Guaman. "All this may sound very small, but it has huge power to interrupt the 'autopilot' mode we often engage day to day, and to give us new perspectives on life."    Keep it regular  It can be helpful to pick a regular time - the morning " "journey to work or a walk at lunchtime - during which you decide to be aware of the sensations created by the world around you.    Try something new  Trying new things, such as sitting in a different seat in meetings or going somewhere new for lunch, can also help you notice the world in a new way.    Watch your thoughts  "Some people find it very difficult to practice mindfulness. As soon as they stop what they're doing, lots of thoughts and worries crowd in," says Professor Guaman.    "It might be useful to remember that mindfulness isn't about making these thoughts go away, but rather about seeing them as mental events.    "Imagine standing at a bus station and seeing 'thought buses' coming and going without having to get on them and be taken away. This can be very hard at first, but with gentle persistence it is possible.    "Some people find that it is easier to cope with an over-busy mind if they are doing gentle yoga or walking."    Name thoughts and feelings  To develop an awareness of thoughts and feelings, some people find it helpful to silently name them: "Here's the thought that I might fail that exam". Or, "This is anxiety".    Free yourself from the past and future  You can practise mindfulness anywhere, but it can be especially helpful to take a mindful approach if you realise that, for several minutes, you have been "trapped" in reliving past problems or "pre-living" future worries.    Different mindfulness practices  As well as practising mindfulness in daily life, it can be helpful to set aside time for a more formal mindfulness practice.    Mindfulness meditation involves sitting silently and paying attention to thoughts, sounds, the sensations of breathing or parts of the body, bringing your attention back whenever the mind starts to wander.    Yoga and jackelyn-chi can also help with developing awareness of your breathing.    Mindfulness Resources:  Mindful.org  [employer] Meditation " Marissa      Material adapted from: https://www.nhs.uk/conditions/stress-anxiety-depression/mindfulness/ and mindful.org

## 2020-08-17 ENCOUNTER — TELEPHONE (OUTPATIENT)
Dept: NEUROLOGY | Facility: CLINIC | Age: 70
End: 2020-08-17

## 2020-08-17 NOTE — TELEPHONE ENCOUNTER
Called and spoke to  regarding her 's appt with . I stated that  still out of the office ill and her return date is unknown. She stated that she will give him the message

## 2020-08-18 ENCOUNTER — TELEPHONE (OUTPATIENT)
Dept: NEUROLOGY | Facility: CLINIC | Age: 70
End: 2020-08-18

## 2020-08-18 NOTE — TELEPHONE ENCOUNTER
----- Message from Dasia Coffman MA sent at 8/17/2020  4:36 PM CDT -----  Contact: pt    ----- Message -----  From: Tacos Mejía  Sent: 8/17/2020   1:52 PM CDT  To: Vivi [facility] Staff    Please call pt at [phone]    Patient is returning staff call regarding the provider being out of clinic    Thank you

## 2020-08-18 NOTE — TELEPHONE ENCOUNTER
----- Message from Brandee Mayo sent at 8/18/2020 11:08 AM CDT -----  Contact: self @ [phone]  Pt says he is returning Dasia's call from yesterday.  He says this is his 3rd message in 2 days.  Pls call asap.

## 2020-09-14 ENCOUNTER — INITIAL CONSULT (OUTPATIENT)
Dept: NEUROLOGY | Facility: CLINIC | Age: 70
End: 2020-09-14
Payer: MEDICARE

## 2020-09-14 ENCOUNTER — OFFICE VISIT (OUTPATIENT)
Dept: NEUROLOGY | Facility: CLINIC | Age: 70
End: 2020-09-14
Payer: MEDICARE

## 2020-09-14 DIAGNOSIS — F40.10 SOCIAL ANXIETY DISORDER: Primary | ICD-10-CM

## 2020-09-14 DIAGNOSIS — G21.8 OTHER SECONDARY PARKINSONISM: ICD-10-CM

## 2020-09-14 DIAGNOSIS — Z87.898 HISTORY OF DELIRIUM: ICD-10-CM

## 2020-09-14 DIAGNOSIS — G47.33 OSA (OBSTRUCTIVE SLEEP APNEA): Primary | ICD-10-CM

## 2020-09-14 DIAGNOSIS — R47.89 WORD FINDING DIFFICULTY: ICD-10-CM

## 2020-09-14 DIAGNOSIS — G47.33 OSA (OBSTRUCTIVE SLEEP APNEA): ICD-10-CM

## 2020-09-14 PROCEDURE — 96132 NRPSYC TST EVAL PHYS/QHP 1ST: CPT | Mod: HCNC,S$GLB,, | Performed by: PSYCHIATRY & NEUROLOGY

## 2020-09-14 PROCEDURE — 96139 PR PSYCH/NEUROPSYCH TEST ADMIN/SCORING, BY TECH, 2+ TESTS, EA ADDTL 30 MIN: ICD-10-PCS | Mod: HCNC,S$GLB,, | Performed by: PSYCHIATRY & NEUROLOGY

## 2020-09-14 PROCEDURE — 96138 PSYCL/NRPSYC TECH 1ST: CPT | Mod: HCNC,S$GLB,, | Performed by: PSYCHIATRY & NEUROLOGY

## 2020-09-14 PROCEDURE — 99499 NO LOS: ICD-10-PCS | Mod: HCNC,S$GLB,, | Performed by: PSYCHIATRY & NEUROLOGY

## 2020-09-14 PROCEDURE — 99499 RISK ADDL DX/OHS AUDIT: ICD-10-PCS | Mod: HCNC,95,, | Performed by: PSYCHIATRY & NEUROLOGY

## 2020-09-14 PROCEDURE — 99214 OFFICE O/P EST MOD 30 MIN: CPT | Mod: HCNC,95,, | Performed by: PSYCHIATRY & NEUROLOGY

## 2020-09-14 PROCEDURE — 99499 UNLISTED E&M SERVICE: CPT | Mod: HCNC,95,, | Performed by: PSYCHIATRY & NEUROLOGY

## 2020-09-14 PROCEDURE — 1159F MED LIST DOCD IN RCRD: CPT | Mod: HCNC,95,, | Performed by: PSYCHIATRY & NEUROLOGY

## 2020-09-14 PROCEDURE — 1101F PR PT FALLS ASSESS DOC 0-1 FALLS W/OUT INJ PAST YR: ICD-10-PCS | Mod: HCNC,CPTII,95, | Performed by: PSYCHIATRY & NEUROLOGY

## 2020-09-14 PROCEDURE — 1101F PT FALLS ASSESS-DOCD LE1/YR: CPT | Mod: HCNC,CPTII,95, | Performed by: PSYCHIATRY & NEUROLOGY

## 2020-09-14 PROCEDURE — 99499 UNLISTED E&M SERVICE: CPT | Mod: HCNC,S$GLB,, | Performed by: PSYCHIATRY & NEUROLOGY

## 2020-09-14 PROCEDURE — 96132 PR NEUROPSYCHOLOGIC TEST EVAL SVCS, 1ST HR: ICD-10-PCS | Mod: HCNC,S$GLB,, | Performed by: PSYCHIATRY & NEUROLOGY

## 2020-09-14 PROCEDURE — 96133 PR NEUROPSYCHOLOGIC TEST EVAL SVCS, EA ADDTL HR: ICD-10-PCS | Mod: HCNC,S$GLB,, | Performed by: PSYCHIATRY & NEUROLOGY

## 2020-09-14 PROCEDURE — 99999 PR PBB SHADOW E&M-EST. PATIENT-LVL II: ICD-10-PCS | Mod: PBBFAC,HCNC,, | Performed by: PSYCHIATRY & NEUROLOGY

## 2020-09-14 PROCEDURE — 96139 PSYCL/NRPSYC TST TECH EA: CPT | Mod: HCNC,S$GLB,, | Performed by: PSYCHIATRY & NEUROLOGY

## 2020-09-14 PROCEDURE — 1159F PR MEDICATION LIST DOCUMENTED IN MEDICAL RECORD: ICD-10-PCS | Mod: HCNC,95,, | Performed by: PSYCHIATRY & NEUROLOGY

## 2020-09-14 PROCEDURE — 99214 PR OFFICE/OUTPT VISIT, EST, LEVL IV, 30-39 MIN: ICD-10-PCS | Mod: HCNC,95,, | Performed by: PSYCHIATRY & NEUROLOGY

## 2020-09-14 PROCEDURE — 96133 NRPSYC TST EVAL PHYS/QHP EA: CPT | Mod: HCNC,S$GLB,, | Performed by: PSYCHIATRY & NEUROLOGY

## 2020-09-14 PROCEDURE — 96138 PR PSYCH/NEUROPSYCH TEST ADMIN/SCORING, BY TECH, 2+ TESTS, 1ST 30 MIN: ICD-10-PCS | Mod: HCNC,S$GLB,, | Performed by: PSYCHIATRY & NEUROLOGY

## 2020-09-14 PROCEDURE — 99999 PR PBB SHADOW E&M-EST. PATIENT-LVL II: CPT | Mod: PBBFAC,HCNC,, | Performed by: PSYCHIATRY & NEUROLOGY

## 2020-09-14 RX ORDER — CARBIDOPA AND LEVODOPA 25; 100 MG/1; MG/1
1 TABLET ORAL 3 TIMES DAILY
Qty: 90 TABLET | Refills: 12 | Status: SHIPPED | OUTPATIENT
Start: 2020-09-14

## 2020-09-14 NOTE — PROGRESS NOTES
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 and occasional myoclonus prompting question of atypical PDism  MRI brain unavailable -upload pending    Had a hospital visit for AMS - unexplained and now back to baseline    Continues carbidopa/levodopa 25/100mg 1 tab PO BID  This helps his L>R hand tremors    In terms of ambulation, shuffles his gait  Does not feel carbidopa/levodopa helps this  Fell once and broke his arm  No assist device needed - can walks miles    He's a psychotherapist  Hikes [place] the woods    Had shoulder myoclonus occasionally    He hasn't used his CPAP in years    DATscan b/l POS per     PD Review of Symptoms:  Anosmia: yes  Dysarthria/Hypophonia: none  Dysphagia/Sialorrhea: none - sialorrhea pos  Depression: none  Cognitive slowing: as above  Hallucinations: None  Urinary changes: NL  Constipation: yes  Orthostasis: none  Dyskinesia: none  Falls: occasional  Freezing: none   Micrographia: yes  Sleep issues:  -PROMISE: yes untreated  -RBD: yes, on clonazepam    Father had a tremor    "History of present illness:   70 y.o.  male seen in consultation at the request of  Dr. Akins for evaluation of Parkinsonism and positive Paul Scan.  Patient of [facility].  Accompanied by wife.    Has had tremors in hands since 20's or 30's, but in the past year, his handwriting has become disabling (psychotherapist and having trouble writing).  Leaves letters out, even writes over his own words.  Feels that half pill of sinemet reduces tremors, but clarifies that he has never had bad tremors.      Hallucinations at night, when " "awakens.  Drags feet, stooped posture.  Long-term loss of smell.  Stuttering at times.  Word-finding is more difficult.  Extremely labile emotions, but denies anxiety or depression.    2017 episode of delirium after taking tamiflu.  Spend week in hospital.  Infectious w/u neg.  However has not been cognitively the same since per wife.  Was shuffling, but thought it was his knees.    +neuropathy, feet, even had nerve biopsy, sees Dr. Castro next week for consult.    1989- myasthenia gravis, present with eye drooping and couldn't paint; s/p thymectomy.  Gets tired with repetitive motions, but hasn't need therapy since then.    Father had tremors.  5 siblings without symptoms.  Son has ataxia, but from a childhood brain tumor."      II.  Review of systems:  As in HPI,  otherwise, balance 10 systems reviewed and are negative.    III.  Past Medical History:   Diagnosis Date    Anxiety     Depression     Melanoma     Myasthenia gravis      Family History   Problem Relation Age of Onset    COPD Mother     Cancer Father         liver     Social history:  , psychotherapist      Current Outpatient Medications on File Prior to Visit   Medication Sig Dispense Refill    cetirizine (ZYRTEC) 10 MG tablet Take 10 mg by mouth once daily.      citalopram (CELEXA) 10 MG tablet Take 10 mg by mouth.      clonazePAM (KLONOPIN) 0.5 MG tablet Take 0.5 mg by mouth once daily.       cyanocobalamin (VITAMIN B-12) 1000 MCG tablet Take 100 mcg by mouth once daily.      famciclovir (FAMVIR) 500 MG tablet famciclovir 500 mg tablet   TAKE 1 TABLET BY MOUTH EVERY DAY      sildenafil (REVATIO) 20 mg Tab Take 3 to 5 pills each pm as needed for ed (Patient not taking: Reported on 5/22/2020) 30 tablet 2    sildenafil (VIAGRA) 100 MG tablet Take 1 tablet (100 mg total) by mouth daily as needed for Erectile Dysfunction. 30 tablet 1    triamcinolone (NASACORT) 55 mcg nasal inhaler nasacort allergy 24 hour gener      [DISCONTINUED] " carbidopa-levodopa  mg (SINEMET)  mg per tablet Take 0.5 tablets by mouth 2 (two) times daily.        No current facility-administered medications on file prior to visit.        PRIOR problem-specific medications tried:  Sinemet (half pill bid)    Review of patient's allergies indicates:   Allergen Reactions    Oseltamivir      Other reaction(s): Confusion    Vancomycin Rash       IV. Physical Exam  Physical Exam  Constitutional: Well-developed, well-nourished, appears stated age  Eyes: No scleral icterus  ENT: Moist oral mucosa  Cardiovascular: No lower extremity edema   Respiratory: No labored breathing   Skin: No rash   Hematologic: No bruising    Other: GI/ deferred   · Mental status: Alert and oriented to person, place, time, and situation;   · follows commands  · Speech: normal (not dysarthric), no aphasia  · Cranial nerves:            · CN II: Pupils mid-position and equal, not tested light or accommodation  · CN III, IV, VI: Extraocular movements full, no nystagmus visualized  · CN V: Not tested   · CN VII: Face strong and symmetric bilaterally   · CN VIII: Hearing intact to voice and conversation   · CN IX, X: Palate raises midline and symmetric   · CN XI: Strong shoulder shrug B/L  · CN XII: Tongue appears midline   · Motor: Normal bulk by appearance, no drift   · Sensory: Not tested    · Gait: No shuffle - bilat arm swing decreased  · Deep tendon reflexes: Not tested  · Movement/Coordination                    Mild hypophonic speech.                     Mild facial masking.  Mild bradykinesia.                           No other dystonia, chorea, athetosis, myoclonus, or tics visualized.    Bradykinesia  ? Finger taps Finger flicks ANIKA Heel taps   Left 2+ 2+ - -   Right 2+ 2+ - -       Tremor Exam   Arms extended Arms in wing position, fingers almost touching Re-emergent Arms extended wrists extended Intention Resting Kinetic   Left ?+ ? ? ? ? ? ?   Right ? ? ? ? ? ? ?   Head tremor: No-No  "Yes-Yes NE      V.  Laboratory/ Radiological Data: (Personally reviewed images)       MRI brain 2020:    Indication: G20, G60.3 Parkinson's disease; Idiopathic progressive neuropathy  Comparison: January 27, 2017.  T1, T2, gradient echo, FLAIR and diffusion imaging of the brain was performed in sagittal, coronal and axial planes. There are a few subcortical periventricular. There are a few subcortical and periventricular high T2 signal lesions in the white matter both cerebral hemispheres, nonspecific finding which may be idiopathic or related to chronic ischemic disease. Comparison exam is compromised by motion. No hemorrhage, mass lesion, mass effect or abnormal fluid collection. Diffusion imaging demonstrates no acute infarct. The carotid and basilar arteries are patent. The pituitary and orbits are normal. The paranasal sinuses are clear.   Impression: Bilateral cerebral white matter lesions, idiopathic versus chronic ischemic disease. Otherwise normal.      Kings 12/2019- diminished uptake with the putamen bilaterally  MRI 2017- report normal      VI. Assessment and Plan        Problem List Items Addressed This Visit        Neuro    Secondary parkinsonism    Overview     2017 decreased step height, RBD, PBA, worsening of underling dystonic tremor.  +Kings scan (bilateral and symmetric).  Dystonic toes (b).         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  Concern for atypical PDism- will review neuropsych report and f/u brain MRI.    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    Ceruloplasmin neg.            Other    PROMISE (obstructive sleep apnea) - Primary    Overview     Unable to tolerate cpap due to recurrent infections.         Current Assessment & Plan     Suggested f/u with sleep due to equipment issues and untreated sleep apnea.         Relevant Orders    Ambulatory referral/consult to Sleep Disorders                No follow-ups on file.             ___________________________________________________________    I appreciate the opportunity to participate in the care of this patient and will communicate my assessment and plan back to the referring physician via copy of this note.                      Sheila Ybarra MD, MS  [facility]  Department of Neurology  Movement Disorders

## 2020-09-14 NOTE — ASSESSMENT & PLAN NOTE
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  Concern for atypical PDism- will review neuropsych report and f/u brain MRI.    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    Ceruloplasmin neg.  "

## 2020-09-21 NOTE — PROGRESS NOTES
NEUROPSYCHOLOGY TELEHEALTH INTERVIEW    Referral Information  Name: Jose Rodríguez  MRN: [id number]  Age: 70 y.o.    : 1950  Race: White    Handedness: Right  Gender: male    Education: 18 years       Referring Provider: Anaya Trinidad MD    Referral Reason/Medical Necessity: Mr. Rodríguez has a history of BUE tremor, PROMISE, dream enactment, and social anxiety. He has been experiencing worsening word finding since an episode of delirium in 2017. Neuropsychological evaluation was requested to assess current cognitive functioning, aid in differential diagnosis, and provide treatment recommendations.    Consent: The patient expressed an understanding of the purpose of the evaluation and consented to all procedures.  Procedures: Chart review, clinical interview, report-writing, brief clinical feedback.  Billing: Please see billing table at the end of this report.      ASSESSMENT    Results from the interview indicate the following diagnoses and treatment plan recommendations. This was discussed with patient/family on 20. The patient can follow a treatment plan without help from family.    Mr. Rodríguez reports longstanding anxiety-driven word-finding difficulty that has been worsening since an episode of delirium in 2017. Symptoms occur primarily in unstructured social settings, and do not occur at work. He doesn't report other cognitive symptoms immediately consistent with typical parkinsonian profiles, although word-finding could be at least partially driven by slowed processing speed. His wife suspects he had a stroke in 2017, but stroke workup was reportedly negative at the time. Outside MRI was recently dropped off with Neurology, but records are not currently available for review. He reports longstanding social anxiety, and cognitive symptoms primarily occur within this context. 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     Neuropsychological testing revealed  strongly intact cognitive functioning with no areas of concern. Psychiatric testing revealed a likely vulnerability to developing physical symptoms in response to stress, alongside recent thoughts of death likely related to phase of life changes and health concerns. There is no evidence of an emerging neurodegenerative process. He will be at higher risk moving forward in the context of a movement disorder, and will benefit from continued monitoring. Regarding additional contributors, I suspect that his episode of delirium had a significant impact on his thinking at the time, and likely took several months to resolve, but I do not see any current evidence of residual symptoms (nor would I expect to after 3 years). I do suspect contribution from poor sleep and anxiety. Emotional swings may be related to possible PBA, but I'd like him to get treatment for the social anxiety and phase of life issues we discussed and see how much that improves his symptoms. Pt is agreeable. Regarding sleep, he has been unable to tolerate CPAP due to recurrent respiratory infections, which he attributes to CPAP. Suspect this is exacerbating his cognitive complaints. Pt reports lifelong (?) dream enactment with hypnopompic hallucainations. I am not sure how much of this could be related to PROMISE, or could be worked out with a repeat sleep study. I can't find documentation for his old sleep study, he estimates 3 to 4 yrs ago. Defer to sleep and neurology colleagues re: the appropriateness of a sleep study referral.     Problem List Items Addressed This Visit        Neuro    Secondary parkinsonism    Word finding difficulty       Other    PROMISE (obstructive sleep apnea)      Other Visit Diagnoses     Social anxiety disorder    -  Primary    History of delirium                 PLAN   1. Encourage follow up with behavioral health treatment for anxiety and phase of life issues. Pt will get in contact with his old therapist and initiate treatment.  Anxiety reduction strategies provided in AVS  2. Continue to follow with Neurology. Compensatory cognitive strategies provided. Refer for repeat eval if he experiences declines in the future.   3. Consider referral to Sleep Clinic. Sleep hygiene tips provided.     Thank you for allowing me to participate in Mr. Rodríguez's care.  If you have any questions, please contact me.    Marlin Howell Psy.D.  Licensed Clinical Neuropsychologist  [facility] - Department of Neurology        HISTORY OF PRESENT ILLNESS   Mr. Rodríguez is here for evaluation of cognitive complaints in the context of positive Paul scan and parkinsonism.  Per recent neurology note, pt reports he has had tremors in hands since 20's or 30's, but in the past year, his handwriting has become disabling (psychotherapist and having trouble writing).  Leaves letters out, even writes over his own words. Feels that half pill of sinemet reduces tremors, but clarifies that he has never had bad tremors. He also endorses: hallucinations at night, when awakened, drags feet, stooped posture, long-term loss of smell, stuttering at times, word-finding is more difficult. extremely labile emotions, but denies anxiety or depression. He was diagnosed with myasthenia gravis in 1989, present with eye drooping and couldn't paint; s/p thymectomy.  Gets tired with repetitive motions, but hasn't need therapy since then. Father had tremors.  5 siblings without symptoms. Son has ataxia, but from a childhood brain tumor.    Pt had an episode of delirium in 2017 after taking Tamiflu - pt states he may have had serotonin syndrome - wife thought he had a stroke, but imaging was clear. He spent a week in the hospital. Infectious workup was negative. Since then, he has had word-finding issues. This happens more often in spontaneous conversations, although this doesn't come up at work. He isn't sure if this is because he feels like an expert, has been doing it for 35 years. But in  "social situations, he tends to experience it more. He has always been quiet and not a center of attention. He has always tended to speak quickly, and feel anxious in social situations, sometimes will stumble over his words, which makes him feel "dumb." This is stressful. This has contributed to him socializing less. He will run through a conversation in his head before he has it.     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     Cognitive Sxs:  · Attention: Denied. Still reads every night before bed, no issues.   · Mental Speed: Not sure if he's thinking more slowly, but does states he does move and react slower.   · Memory: Recall is pretty good. Not forgetful, not misplacing things. Remembers clients without having to re-read charts.   · Language: Endorses word-finding difficulty. Has been present since his early 30's, but has been worse since 2017, continuing to get worse. No paraphasias. Receptive language is intact. Word finding is worse in unstructured social situations.   · Visuospatial/Perceptual: No changes to sense of direction. Still goes to the woods a lot and doesn't get lost.   · Executive Functioning: No difficulty with planning, organization, multitasking, problem solving, etc. No impulsivity, no one is worried about his judgment. 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 He is having more emotional swings, he cries easily every day in any situation that is emotional or intense (a sad movie or story, other emotional situation). Not overly emotional in patient sessions. Not having inappropriate laughing. Typically episodes of sadness are associated with an emotional stimulus, they don't occur out of the blue. He does feel more self-reflective recently given his age, feels that this sets him up to be more emotional.     Onset/Course: Mr. Rodríguez's symptoms were " gradual in onset in his early 30's (word finding) and are worsening, particularly after his 2017 episode of delirium. Word finding is worse in unstructured social situations, better in structured settings (work as a therapist, hobby work as civil war re-enactor). The pt denies any exacerbating or ameliorating factors. No waxing/waning of cognitive status.    Neuropsychiatric Sxs:  Pt had some depression when his son was diagnosed with a brain tumor at age 2 and age 17. He has had anxiety and depression over the years, has been on antidepressants. He has been in therapy to explore these issues. He was last in formal MH treatment about 15-20 years. Never been hospitalized. No suicide attempts.     · Self-Described Mood: apprehensive recently. Anxiety has seemed higher recently. He was anxious about today's appointment.   · Depressed Mood: Denied demetri depressed mood and anhedonia. He takes care of himself, continues to participate in hobbies.   · Anxiety: Endorsed increased anxiety recently over his health. He has read about DLB and is worried about dementia.  Denies generalized anxiety. He has always been more anxious in social situations, agrees that he has flavors of social anxiety disorder. He has a stress-related spastic colon, this has contributed a lot to social anxiety. He has always had word-finding and pressured speech in social situations. Rehearses important conversations ahead of time. He had one panic attack in graduate school but none since. Other than GI issues, does not tend to experience physical symptoms of stress.   · Stress: His own health; wife retired recently, they are getting to spend more time together recently.   · SI/HI: Denied   · Neurovegetative:  · Sleep: Was decreased until he began taking Klonopin (started January 2020). He was waking up frequently and having hallucinations (seeing snakes in the bed, elephant in the other room). He has been sleep walking occasionally, talking in his  sleep. Pt reports that he has ALWAYS had active dreams, hallucinations, acting out dreams. He is not sure if this is related to untreated PROMISE. In the past, when his anxiety was worse, he had difficulty falling asleep due to rumination and anxiety, but this has not happened for years. If something is bothering him, he occasionally wakes up earlier than usual.   · Total Hours/Night: 7 to 8   · Pattern:falls asleep easily, has restless sleep, has frequent nighttime awakenings and is not rested upon awakening   · PROMISE: Yes, CPAP noncompliant due to getting repeated respiratory infections, which he attributes to the CPAP. Has not tried CPAP for 3-4 years. Had difficulty keeping up with cleaning it daily. He has lost weight, which he thinks has helped. Wife says he still snores, although less than before. Wife does not notice apneic events. He does have daytime sleepiness, will doze off when sedentary.   · Nightmares: Denied   · Acting out dreams: Endorsed, longstanding  · Daytime Naps: Denied, although he is sleepy enough that he could nap if he had time.  · Appetite: normal. Will do some nervous eating.   · Energy: decreased, finds that he is less active than he used to be.    · Delusional/Paranoid Thinking: Denied  · Hallucinations: Endorsed hypnopompic hallucinations, wakes frequently during the night. No hallucinations during the day.  · Impulsive/Compulsive Behaviors: Denied  · Disinhibition: Denied  · Apathy: Denied  · Irritability/Agitation: Some increased frustration at work, with his symptpms    Physical  · Motor:  BUE intention tremor for most of his life.   · Gait:  Minimal changes per last neuro exam. Wife states he shuffles and takes smaller steps and is stooped. Pt attributes much of this to orthopedic surgeries. He has hammer toes, can only move his big toes.   · Sensory: No major changes to hearing or vision. Wears reading glasses. Has longstanding loss of sense of smell, which he attributes to use of  nasal allergy sprays. He has neuropathy in his right foot and leg.   · Pain: Mr. Rodríguez described current pain at a 0 on a scale of 1-10, with 10 being worst. 0    Current Functioning (I/ADLs):  · ADLs: independent   · IADLs:  · Finances: independent   · Medication Mgmt:independent   · Driving: independent. Wife has always thought he drives fast.   · Household Mgmt: independent      RELEVANT HISTORY:  Prior Testing:  None    Neurologic History  · Seizures: None  · Stroke: None. Wife was surgical nurse for 30 years, believes he had a stroke during delirium episode, but neuroimaging and stroke workup was normal.   · TBI: Denied  · Movement Disorder: Yes  · CNS infection: Denied  · Brain tumor: Denied  · Other neurologic history: Myasthenia gravis (s/p thymectomy)  · Dysautonomia: Urinary urgency, chronic constipation   · Referral Diagnosis: R47.89 (ICD-10-CM) - Word finding difficulty    Neurodiagnostics:   None available in our system   Positive Nando Scan, per record review    Substance Use History:  Social History     Tobacco Use    Smoking status: Never Smoker    Smokeless tobacco: Never Used   Substance and Sexual Activity    Alcohol use: No    Drug use: Not Currently. Tried marijuana and LSD in college.     Sexual activity: Not on file   · Caffeine          1 cup coffee per day, one glass of diet iced tea.     Medical history  Patient Active Problem List   Diagnosis    Screening for colorectal cancer    Secondary parkinsonism    Tremor    Dream enactment behavior    Word finding difficulty    History of myasthenia gravis    Neuropathy    RLS (restless legs syndrome)    PROMISE (obstructive sleep apnea)    Pseudobulbar affect     Past Medical History:   Diagnosis Date    Anxiety     Depression     Melanoma     Myasthenia gravis      Past Surgical History:   Procedure Laterality Date    COLONOSCOPY N/A 5/20/2016    Procedure: COLONOSCOPY-Miralax prep;  Surgeon: Lucien Rader Jr., MD;  Location:  [facility];  Service: Endoscopy;  Laterality: N/A;    HERNIA REPAIR      inguinal double    KNEE ARTHROSCOPY W/ MENISCAL REPAIR Right     LAMINECTOMY  03/2019    mya      SKIN SURGERY      melanoma removal    SPINE SURGERY      L5-S1    THYMECTOMY      TOTAL KNEE ARTHROPLASTY Left        Pertinent Labs:  Lab Results   Component Value Date    [id number] 621 05/22/2020     No results found for: RPR  No results found for: FOLATE  Lab Results   Component Value Date    TSH 1.822 05/22/2020     No results found for: LABA1C, HGBA1C  No results found for: HIV1X2, QLS98AFBI      Current Outpatient Medications:     carbidopa-levodopa  mg (SINEMET)  mg per tablet, Take 1 tablet by mouth 3 (three) times daily., Disp: 90 tablet, Rfl: 12    cetirizine (ZYRTEC) 10 MG tablet, Take 10 mg by mouth once daily., Disp: , Rfl:     citalopram (CELEXA) 10 MG tablet, Take 10 mg by mouth., Disp: , Rfl:     clonazePAM (KLONOPIN) 0.5 MG tablet, Take 0.5 mg by mouth once daily. , Disp: , Rfl:     cyanocobalamin (VITAMIN B-12) 1000 MCG tablet, Take 100 mcg by mouth once daily., Disp: , Rfl:     famciclovir (FAMVIR) 500 MG tablet, famciclovir 500 mg tablet  TAKE 1 TABLET BY MOUTH EVERY DAY, Disp: , Rfl:     sildenafil (REVATIO) 20 mg Tab, Take 3 to 5 pills each pm as needed for ed (Patient not taking: Reported on 5/22/2020), Disp: 30 tablet, Rfl: 2    sildenafil (VIAGRA) 100 MG tablet, Take 1 tablet (100 mg total) by mouth daily as needed for Erectile Dysfunction., Disp: 30 tablet, Rfl: 1    triamcinolone (NASACORT) 55 mcg nasal inhaler, nasacort allergy 24 hour gener, Disp: , Rfl:     Family History:  Family History   Problem Relation Age of Onset    COPD Mother     Cancer Father         liver     Family Neurologic History: Father had tremor. Great grandfather had PD. No dementia that he knows of. Strong family history of multiple types of cancer.   Family Psychiatric History: Negative for heritable risk factors  "    Developmental/Academic Hx:  Developmental: Born and raised in [place], [place]. No gestational or later developmental concerns. Product of a normal pregnancy and delivery. Mother told him he hit developmental milestones early. No history of abuse, though parents were not always supportive of him. Middle of 6 children, some low self-esteem, got "lost in the mix." Lots of conflict between his parents growing up. Always a sensitive kid.  Academic:  · Learning Difficulties: Elementary school was a struggle (attributes this to home life), did better in high school, was on the Matt's list in college.   · Attention Difficulties: Daydreamed frequently, would fall behind in math and reading. Didn't raise his hand if he needed help. Suspects he had elements of social anxiety as a kid as well.   · Behavioral Difficulties: Denied formal discipline at school, but was often in trouble at home  · Educational Attainment: 18 - LCSW    Social/Occupational Hx:     Occupational Hx:  · Occupational Status: Employed full time   · Primary Occupation(s): Psychotherapist, [employer]       Social:  · Resides: in [place] with wife   · Current Relationship/Family Status:  x1,  to current wife 38 years. Has 1 biological son and 1 stepdaughter. Good relationships with wife and kids.   · Primary Source of Support: wife. Pt states he has a good social support network.   · Leisure Activities: Civil war re-enactment, collecting treasure to re-sell.         OBJECTIVE:       MENTAL STATUS AND BEHAVIORAL OBSERVATIONS:  Appearance:  Casually dressed and Well groomed  Behavior:   calm, cooperative and rapport easily established. Left arm was in a brace.   Orientation:   Fully oriented  Sensory:   Hearing and vision appeared adequate for testing purposes.  Gait:   Unremarkable  Psychomotor:  RUE tremor   Speech:  Fluent and spontaneous. Normal volume, rate, pitch, tone, and prosody.  Language:  Receptive and expressive language appear intact., " Evidence of mild word-finding difficulties in conversational speech., Phonemic paraphasic errors present  Mood:   anxious  Affect:   mood-congruent  Thought Process: goal directed, linear and within normal limits  Thought Content: WNL, Denied current SI/HI. and No evidence of psychotic symptoms.  Memory:  Recent and remote appear grossly intact  Attn/Concentration:  Grossly intact  Fund of Knowledge: Broadly WNL  Judgment/Insight: Grossly intact  Validity/Test Taking Bx: Performance on standalone and embedded performance validity measures all suggested adequate engagement. As a result, scores are likely a valid reflection of the pt's functioning.  Pt was easily discouraged, exhibited negative self-talk, and required frequent reassurance and encouragement. He became fatigued by the end of the testing session. He appeared anxious about his performance. Symptom validity scales on an objective personality inventory were all WNL. As a result, symptom inventories are thought to be an accurate reflection of his functioning.       PROCEDURES/TESTS ADMINISTERED:  In addition to performing a review of pertinent medical records, reviewing limits to confidentiality, conducting a clinical interview, and explaining procedures, the following measures were administered:   Test of Premorbid Functioning (TOPF), Wechsler Adult Intelligence Scale - Fourth Edition (WAIS-IV), selected subtests , Ruff 2&7 Selective Attention Test , Neuropsychological Assessment Battery (NAB) Naming subtest, Controlled Oral Word Association Test (FAS/Cassy et al., 2004), Animal Naming (Cassy et al., 2004), Trail Making Test (Cassy et al., 2004), Stroop Color Word Test, [place] Card Sorting Test (WCST-128), Andre Complex Figure Test (Copy Trial), [place] Verbal Learning Test - Second Edition (CVLT-II), Standard Form, Wechsler Memory Scale - Fourth Edition (WMS-IV), selected subtests, State Trait Anxiety Inventory: Short Form (STAI-SF), Downing Depression  Inventory - Second Edition (BDI-II) and [place] Multiphasic Personality Inventory -2 RF (MMPI-2-RF). Manual norms used unless otherwise indicated.     NEUROPSYCHOLOGICAL ASSESSMENT RESULTS:  The following should not be interpreted in isolation from the neuropsychological evaluation report.  Scores on stand-alone and embedded performance validity measures were WNL.    PREMORBID FUNCTIONING Raw Score Standardized Score Percentile/CP Descriptor   TOPF simple dem. eFSIQ - 115 84 High Average   TOPF pred. eFSIQ - 109 73 Average   TOPF simple + pred. eFSIQ - 113 81 High Average   LANGUAGE FUNCTIONING Raw Score Standardized Score Percentile/CP Descriptor   WAIS-IV Similarities 32 14 91 Above Average   TOPF Word Reading 54 111 77 High Average   NAB Naming 31 55 69 Average   FAS 45 49 46 Average   Animal Naming 19 48 42 Average   VISUOSPATIAL FUNCTIONING Raw Score Standardized Score Percentile/CP Descriptor   WAIS-IV PARUL - 117 87 High Average   WAIS-IV Block Design 45 13 84 High Average   WAIS-IV Matrix Reasoning 18 13 84 High Average   VR Copy 42 - 26-50 Average    RCFT Copy 31 - >16 WNL   RCFT Time to Copy 109 - >16 WNL   LEARNING & MEMORY Raw Score Standardized Score Percentile/CP Descriptor   CVLT-II        Trials 1-5 (T-Score) 47 57 76 High Average   List A Trial 1 4 -1.5 - Below Average   List A Trial 5 14 1.5 - Exceptionally High   List B 6 0.5 - High Average   SDFR 13 1.5 - Exceptionally High   SDCR 14 1.5 - Exceptionally High   LDFR 13 1.5 - Exceptionally High   LDCR 14 1.5 - Exceptionally High   Semantic Clustering 1.3 1 - High Average   Learning Bent 2.6 2.5 - Exceptionally High   Repetitions 4 0.5 - High Average   Intrusions 5 0.5 - High Average   Recognition Hits 15 0.5 - High Average   False Positives 1 -1 - Low Average   Discriminability 3.4 1 - High Average   WMS-IV        LM I 28 11 63 Average   LM II 26 12 75 High Average   LM Recognition 27 - >75 High Average    VR I 37 12 75 High Average   VR II 31 13  84 High Average   VR II Recognition 6 - >75 High Average    VR Copy 42 - 26-50 Average    ATTENTION/WORKING MEMORY Raw Score Standardized Score Percentile/CP Descriptor   WAIS-IV WMI - 117 87 High Average   WAIS-IV Digit Span 30 12 75 High Average         DS Forward 12 13 84 High Average         DS Backward 10 12 75 High Average         DS Sequence 8 10 50 Average         Longest Forward 8 - - -         Longest Backward 5 - - -         Longest Sequence 6 - - -   WAIS-IV Arithmetic 18 14 91 Above Average   Ruff 2&7 Total Speed 91 36 8 Below Average   Ruff 2&7 Total Accuracy 115 57 77  High Average   Ruff 2&7 Total Difference 45 P=0.01      MENTAL PROCESSING SPEED Raw Score Standardized Score Percentile/CP Descriptor   WAIS-IV PSI - 111 77 High Average   WAIS-IV Symbol Search 34 13 84 High Average   WAIS-IV Coding 60 11 63 Average   TMT A  27 53 62 Average   TMT A errors 0 - - -   Stroop: Word Reading 101 44 27 Average   Stroop: Color Naming 78 50 50 Average   EXECUTIVE FUNCTIONING Raw Score Standardized Score Percentile/CP Descriptor   TMT B 57 55 69 Average   TMT B errors 0 - - -   Stroop: C/W 36 45 31 Average   Stroop: Interference -8 42 21 Low Average   WAIS-IV Matrix Reasoning 18 13 84 High Average   WAIS-IV Similarities 32 14 91 Above Average   WCST-128        Total Correct 84  - -   Total Errors 20 102 55 Average   Perseverative Resp. 11 102 55 Average   Perseverative Err. 11 101 53 Average   Nonperseverative Err. 9 100 50 Average   Concept. Level Response 81 78 7 Below Average   Categories Completed 6 - >16 WNL   FMS 1 - >16 WNL   Learning to Learn -3.18 - >16 WNL   MOOD & PERSONALITY Raw Score Standardized Score Percentile/CP Descriptor   BDI-2 2 - - Minimal   STAISF:State 18 - 68 Average   STAISF:Trait 11 - 15 Low Average   MMPI-2 RF   - -   VRIN-r 2 43 - -   AVRIL-r 10 57 - -   F-r 3 56 - -   Fp-r 0 42 - -   Fs 1 50 - -   FBS-r 7 48 - -   RBS 7 59 - -   L-r 4 57 - -   K-r 7 48 - -   MARCELO 9 49 - -   THD 0 39 -  -   BXD 2 40 - -   RCd 1 42 - -   RC1 8 63 - -   RC2 4 50 - -   RC3 4 46 - -   RC4 0 34 - -   RC6 0 43 - -   RC7 3 44 - -   RC8 0 39 - -   RC9 6 40 - -     PROCESS NOTES:   PERFORMANCE VALIDITY: WNL   PREMORBID: Estimated to be in the high average range, consistent with educational and occupational history.  LANGUAGE: intact . No errors on a confrontation naming task. No significant difference between semantic and phonemic fluency, both of which are WNL.   VISUOSPATIAL: intact .  No broken configuration on a block construction task. No misperceptions on a confrontation naming task.  Copy of geometric figure was WNL overall, despite a disorganized approach and impulsive/inattentive errors. Pt was able to appreciate the gestalt of the image. Nonverbal abstraction and visuoconstruction were particularly strong. No evidence of demetri visuospatial dysfunction. Interference from tremor was minimal on graphomotor tasks.  LEARNING/MEMORY: intact .  Pt demonstrated a strong learning curve on a word list (4, 5, 11, 12, 14), with good use of a semantic learning strategy. No evidence of proactive or retroactive interference. He demonstrated excellent free recall after both a short and long delay (93% retention). Performance on the associated recognition measure was WNL.  Performance was similarly strong on a contextual story memory task. Recognition memory for story information was WNL. Visual memory encoding and retrieval were also well quite strong, with intact  performance on the associated recognition measure. Overall, pt demonstrated intact  encoding, retrieval, and consolidation of both verbal and nonverbal information, with the strongest performance on the most difficult task of unstructured learning.   ATTENTION/WORKING MEMORY: intact .  Simple attention span was intact  (repeating 8 digits forward). Working memory span was also intact  (repeating 5 digits backwards and 6 digits sequenced). On a vigilance task, the pt  tended to sacrifice speed for accuracy. Mental arithmetic was above average.   PROCESSING SPEED: intact  Both written and verbal speeded tasks were well within the expected limits.   EXECUTIVE FUNCTIONING: intact   Mental flexibility and set shifting was average with no errors.  Verbal and nonverbal abstraction were particularly strong. Verbal response inhibition was WNL.  MOOD/PERSONALITY:  Pt did not report clinically significant levels of anxiety or depression on mood screenings. On an objective personality inventory, review of validity scales indicated that the pt responded to the items relevantly on the basis of their content, with no indications of over- or under-reporting. Review of clinical scales revealed only reports of vague neurological complaints. Individuals with similar endorsement patterns are likely to present with multiple somatic complaints, to be preoccupied with physical health concerns, and to be prone to developing physical symptoms in response to stress. His responses also indicated a history of thoughts of death, and may indicate increased risk for SI or attempts. In the context of his history and reported complaints, this is likely to reflect general health concerns and phase of life changes rather than explicit suicidal ideation. There are no indications of thought or behavioral dysfunction in this protocol. He reports a below average level of aggressive and antisocial behavior.     GAD7 5/29/2020   1. Feeling nervous, anxious, or on edge? 0   2. Not being able to stop or control worrying? 0   3. Worrying too much about different things? 0   4. Trouble relaxing? 0   5. Being so restless that it is hard to sit still? 0   6. Becoming easily annoyed or irritable? 0   7. Feeling afraid as if something awful might happen? 0   LIZBETH-7 Score 0       PHQ9 5/29/2020   Total Score 0       NPIQ RFS 5/29/2020   WHO IS FILLING OUT FORM? PWD   Does this patient have false beliefs, such as thinking that  others are stealing from him/her or planning to harm him/her in some way? No   Does this patient have hallucinations such as false visions or voices? [place] she/he seem to hear or see things that are not present? No   Is the patient resistive to help from others at times, or hard to handle? No   Does the patient seem sad or say that he/she is depressed? Yes   Depression/Dysphoria Severity 1   Depression/Dysphoria Distress 1   Does the patient become upset when  from you? Does he/she have any other signs of nervousness such as shortness of breath, sighing, being unable tor elax, or feeling excessively tense? No   Does the patient appear to feel good or act excessively happy? No   Does this patient seem less interested in his/her usual activities or in the activities and plans of others? No   Does this patient seem to act cumpolsively, for example, talking to strangers as if she/he knows them, or saying things that may hurt people's feelings? No   Is the patient impatient and cranky? Does he/she have difficulty coping with delays or waiting for planned activities? No   Does the patient engage in repetitive activities such as pacing around the house, handling buttons, wrapping string, or doing other things repeatedly? No   Does this patient awaken you during the night, rise too early in the morning, or take excessive naps during the day? Yes   Nightime Behavior Severity 2   Nightime Behavior Distress 1   Has the patient lost or gained weight, or had a change in the type of food he/she likes? No   NPI Total Severity Score 3   NPI Total Distress Score 2         Billing/Services Summary          Neurobehavioral Status Exam Base Code (50310)  Total Units: 0    Face-to-face Total Time: 0 min.         Professional Neuropsychological Testing Evaluation Services Base Code (73924)   Total Units: 1 Add-on (01050)  Total Units: 2   Referral review/initial test selection 15 min.    Intra-session Clinical Decision-Making           Tech consult/test review/modification 0 min.         Patient behavior management 0 min.    Face-to-face Interpretive Feedback 60 min.    Record Review/Integration/Report Generation 120 min.     Total Time: 195 min.         Test Administration by Psychologist Base Code (81092)   Total Units: 0 Add-on (39652)  Total Units: 0   Testing 0 min.    Scoring 0 min.     Total Time: 0 min.         Test Administration by Technician  Technician Name: Abhijit Lopez Base Code (63611)   Total Units: 1 Add-on (57932)\  Total Units: 9   Face-to-Face Testin min.    Scoring 87 min.     Total Time: 288 min.    DOS is the date of the evaluation unless specified

## 2020-09-22 ENCOUNTER — OFFICE VISIT (OUTPATIENT)
Dept: NEUROLOGY | Facility: CLINIC | Age: 70
End: 2020-09-22
Payer: MEDICARE

## 2020-09-22 DIAGNOSIS — G21.8 OTHER SECONDARY PARKINSONISM: ICD-10-CM

## 2020-09-22 DIAGNOSIS — R47.89 WORD FINDING DIFFICULTY: ICD-10-CM

## 2020-09-22 DIAGNOSIS — F40.10 SOCIAL ANXIETY DISORDER: Primary | ICD-10-CM

## 2020-09-22 PROCEDURE — 99499 UNLISTED E&M SERVICE: CPT | Mod: HCNC,95,, | Performed by: PSYCHIATRY & NEUROLOGY

## 2020-09-22 PROCEDURE — 99499 NO LOS: ICD-10-PCS | Mod: HCNC,95,, | Performed by: PSYCHIATRY & NEUROLOGY

## 2020-09-22 NOTE — PATIENT INSTRUCTIONS
Your Healthy Brain - Strategies to help with Word Finding    Not being able to find a word when you need it is a common and very annoying problem. It is not strictly a memory issue, but more a filing and retrieval issue. You know the word or name you want, but it cannot be found in your brain file. It is like having all the files in your file cabinet emptied on the floor. Every piece of information is there but finding it can be a challenge.     One strategy that may help is working with a speech pathologist/therapist to learn techniques to decrease word finding problems. Some of those strategies/techniques include the following:   Use circumlocutions. Describe the object you're trying to name instead of naming it.   Recite you're A, B, Cs. Go through the alphabet to see if a letter triggers finding the word.   Picture it. Try to visualize the spelling or writing of the word.   Relax. The harder you try to force yourself to come up with the word, the more frustrated you get and the worse you function.    Write it down. In situations where using correct words is critical, such as communicating on the radio while flying, write down the key words so that they are in front of you if needed.   Use word association. For words or names you continually misfile and can't find, try to come up with a word association, something that reminds you of the word or name.   Write a script. Try scripting something important that you want to say. Either write it out or practice it beforehand, so that you get it right.   Play word games. Doing crossword puzzles and playing word finding games may help your brain become more efficient at filing and retrieving words.    Your Healthy Brain - Strategies to help with learning and memory      Type of Memory Issue      Cognitive Strategy   Attention-based trouble  Remember that inattention and lack of focus are major culprits to forgetting information so be sure and practice  paying attention for adequate learning of information. Patients will rely on passive attention to remember something (e.g., david, uh-huh approach) and find they cannot recall it later. We recommend the following to improve attention, which may aid in later recall:    Look at the person as they are speaking to you, Paraphrase as they are speaking   Write down important pieces of information    Ask them to repeat if you zone out.    Simplify and reduce information that you need to focus on during conversation.    Have visual cues to remind you if you need to do something later.   Speed-based trouble  Using multiple modalities (e.g., listening, writing notes, asking questions, recording, follow-up meetings with faculty/bosses, discussion boards online) to learn new information also can be helpful and is likely to allow additional time for processing, thus improving memory for the material.    Learning new information  Simplify - Use easier words and shorter sentences. Break down into steps.   Restate - Put information into your own words.  Does it make sense? This allows you and others to test for understanding.   Link - If possible, associate new information with something you already know.   Organize - Group items into meaningful categories.  You can organize by time, location, color, shape, size, function, and even age.  Be creative.    Break it up - Don't try to take in too much at one time. Concentrate for a few minutes, then move on to something else. You may learn more in short sessions than one long one.   Mnemonics (pronounced noo-mon-ics) are strategies whereby you form acronyms ( = Cereal, Oranges, Pizza) that stand for something. This may be useful at times when you need a cue or prompt to help you remember something. Word associations can also be helpful ([place] works in the [facility]).     Storing information for later recall  Rehearse - Immediately after seeing/hearing  something, try to recall it.  Wait a few minutes, then check again.  Gradually lengthen the time between rehearsals. Initially, you might rehearse the same thing every minute, then 15-minutes, then every few hours.   Repetition of learned material is critical to ensure storage of information to be learned.    Self-test at home to ensure learning. Just because something was remembered once doesn't mean it will be remembered after it was first learned.    Have friends or family periodically re-quiz you multiple times in a study session.     Recalling Information  Jog your memory - Lose something?  Think back to when you last had it.  What did you do next?  And after that?  Mentally walk yourself through each activity that followed.  Prodding your memory this way may enable you to recall the location of the missing item.   Pair something you always remember (keys, purse, phone) with something you may forget (grocery list, bill you need to pay).   Get organized - Have fixed locations for all important papers, key phone numbers, medications, keys, wallet, glasses, tools, etc.   Develop routines - Routines can anchor memories so they do not drift away.      External Strategies  Have memos, timers, calendar notes, etc.  in visible, appropriate places so you can use them for recalling information.   Invest in a smart phone and learn to use its reminder functions. This can be a way to interact with your children or grandchildren in a fun way.   Meet with a counselor to analyze and revise personal organization strategies and develop more effective memory cues and planning strategies. This recommendation is designed to help you develop a new skill set for personal organization based.    Maintaining a regular daily schedule may be beneficial. Keeping a calendar and notepad or alarms via a smart phone can alert you to the day's activities.   School-based learning  Read the information and underline, then go back over  and talk through what you just read. Break it up in paragraph bits so you keep what you have to learn to a minimum. Don't just rely on passively reading something.   Take notes in the form of an outline; Use note cards over and over   Create mnemonics (example: Please Excuse My Dear [place] to remember mathematics order or operations)   Create acronyms (example: PEMDAS to remember math order of operations)   Utilize self-talk as you read through the material   Create broad headings of material you need to know and then talk or write down what you have learned from memory   Teach the material to a friend, parent, or sibling without notes.   Summarize facts in a table or flow charts for visual encoding   Memory Hygiene  Engage in regular exercise, which increases alertness and arousal, which can improve attention and focus.    Get a good night's sleep, as sleep is necessary for memory consolidation. Caffeine intake in the afternoon/evening, stuffing oneself at supper, and using technology close to bedtime can decrease the quality of restful sleep throughout the night. Additionally, bedtime and wake-up times should be consistent every night and morning so the body becomes used to a single sleep/awake routine.   Eat healthy foods and balanced meals. It is notable that research indicates certain nutrients may aid in brain function, such as B vitamins (especially B6, B12, and folic acid), antioxidants (such as vitamins C and E, and beta carotene), and Omega-3 fatty acids. Talk with your physician or nutritionist about what's best.   Prospective Memory (remembering to remember to do something)  We recommend having visual cues (e.g., pill boxes) and alarms (e.g., phone alarm) set to remind you to do something in advance. It's important that loved ones and caregivers understand this difficulty for you. Daily lists of what you need to do can also be helpful.     Your Healthy Brain - Strategies to help with  executive functions      Type of Executive Dysfunction      Cognitive Strategy   Initiation and Drive   (getting started on things)  This can be exhibited behaviorally, such that they cannot get started on physical activities such as getting up or working out, socially such that they have difficulty calling friends or going out to be with friends, academically/occupationally, such that they have trouble getting started on assignments, or cognitively, such that they have difficulty coming up with ideas or generating plans.     Basic tenets of intervention include providing additional external structure, prompting and cueing, and helping organize and plan.     Increased structure in the environment or in an activity can help with initiation difficulties.  Building in routines for everyday activities is often important, since routine tasks become more automatic, reducing the need for independently starting something.  For example, routines can be broken down into a sequence of steps, and these steps can be written down on index cards or a simple list.  You can then follow the list of steps each day as needed until the routine becomes automatic.     External prompting may be necessary to get started.  Someone might stop by the desk at the outset of each task and prompt them to start work, or perhaps demonstrate the first problem of a worksheet.  At home, parents might need to gently prompt to get started on homework, chores, or to go out with friends.    Some people benefit from having time limits set for completing a task or setting specific times of day when they will work on a task.  Use of a timer may facilitate increased initiation and speed of task completion.   Problems with initiating may be exacerbated by feeling overwhelmed with a given task.  Breaking tasks into smaller, more structured steps may reduce a sense of overwhelm and increase initiation.   Find tasks that are inherently motivating to build  self-initiation   Impulsivity  (doing something without thinking it through)  Have explicit, extensive and/or clear set of rules and expectations, and reviewed frequently or have posted. Collaborate when possible to have buy-in with rules.   Response delay techniques can be helpful such that you count to 5 or 10 before responding verbally or physically.     Several stop and think methods are available that teach individuals to inhibit their initial response, to consider the potential consequences of their behaviors, and to further develop a plan of approach to a situation.  Consultation with a psychologist would be helpful.    If you have an impulsive approach to tasks, then verbalize a plan of approach before starting work.  This places a short time period between impulse and action and can allow for better planning and a more strategic approach.  You could explain how you will approach a task, including goals for accuracy and time. Also, we encourage spending some time thinking about different kinds of plans to help focus attention on possible consequences or alternate strategies.    Having unstructured (impulse time) activities are also important given the significant cognitive demands placed on individuals to inhibit impulsive behavior. This can include athletic pursuits, art, cristino, or whatever is enjoyable.     Task Persistence/Vigilance:  (sticking to it)  Having a written checklist of steps required to complete a task can serve as an external guide to stick to something until all steps are completed.   Pair enjoyable tasks with something that is not well liked. For instance, play enjoyable music when you are cleaning the house. Or, provide strong incentives once an entire tasks is completed.    Work with someone who has good task persistence (and, who you like) as they can be a good motivator.    Planning/Organization  Frequently, individuals with executive dysfunction have organizational  difficulties and, consequently, their disorganization only serves to worsen their executive dysfunction. They will need to work with someone to not only initiate organization, but also to maintain organization over time. Once they become organized, they'll likely need some sort of visual reminder at home or in the office to continue staying organized along with a set time to organize.    Information should be presented in an organized manner (e.g., outline, power point). If this is not possible, then you will need to organize that information before you get started on a task. It will be tempting to just jump and start, but take a moment and get organized first. Also, if someone is presenting you with information (e.g., do this, then that, then this), then you'll need to write that down in an organized fashion.    Have an organization system at home and stick to a routine of maintaining it. Online tools have amazing ideas for doing this. You can have a planner, electronic tool like an iPhone, files, color coordinated pen, wall calendar, files on your computer for certain courses or projects. Brainstorm what works best for you.   Given the particular difficulty managing complex, long-term assignments, students/employees with organizational difficulties often benefit from working on only one task, or one step of a larger task, at a time.     Tasks (big or small) may need to be broken down into smaller steps in order to facilitate organization and planning.     Seek out professional help if the above strategies are not proving effective.   Divided Attention/Multi-tasking  Most people have limited divided attention and research has shown that we are less effective when we are doing several things at once (e.g., checking email, reading online, & watching television). Try to methodically engage in one task at a time to limit problems with divided attention.    Set up some organizers so you can move from one  separate task to another.    Have visual cues to keep you on track when you have a lot to do. This will re-orient you and remind you. For instance you may be doing something, get distracted, but then see your notepad that has your next task.      Your Healthy Brain - Strategies to help with attention and focus    Type of Attention Problem Cognitive Strategy   Auditory Attention  Establish eye contact and attention to the speaker to better remember instructions/directions.   Repeat back what the speaker has said or have a notepad and write down.    If you zone out, then politely say so and have them repeat what they said.    Classes often move fast and rarely will professors adjust their style to accommodate all the different preferences and needs of students. We encourage investing in a recording device that allows you to go back and re-listen to a lecture if you have missed something when taking notes.    Reduce noise that you find distracting. It may also be helpful to have some ambient noise (e.g., white noise, calm music, fan) if you can't reduce or eliminate noise.    Reduce auditory distractions. This might include turning off your phone so you aren't tempted to look at text messages when studying or listening.      Visual Attention  Reduce visual distractions. For instance, stick to one page at a time and don't have multiple pages or screens up. Make a list of common distracters and have those in mind so you can prepare ahead of time.    Use your  to your advantage by enlarging fonts, lindsey, italicizing, and coloring, material.    Use visual cues to help you zero in on the material. Highlight and underline.    Be mindful that electronic devices (e.g., computers, tablets) are very prone to distractibility when attempting to read because you can be tempted to surf online, click a hyperlink, and so forth. Think about whether you prefer printed or electronic material and use that.     Our eyes can become exhausted for a while when engaging in visually based tasks. Take breaks to rest your eyes. Search online for eye relaxation exercises or talk with an optometrist.      Orienting Yourself/Monitoring/Planning  Set up a plan for the day, week, and semester/month. This includes the types of classes scheduled (e.g., matching difficult and easy classes), times of day you schedule them (e.g., attempt to schedule classes at a time when most attentive, try to have some breaks between classes), which teacher/professor (e.g., some are harder than others), and types of classes (e.g., try to schedule classes with three, 50-minute lectures rather than one 3-hour class).    plan meals, exercise, and relaxation time into the schedule. This is called pacing whereby you have some strategies to manage fatigue, hunger, and restlessness that impact attention.    Use active mental strategies to avoid attentional lapses. For instance, frequently ask: (1) What I am currently doing? (2) What was I doing before this? (3) What do I need to do next? You'll likely need an external cue for some time (e.g., alarm on phone, visual reminder) to integrate this into your day.       Sustained Attention  Have realistic expectations and plan out what you can do in a given hour, day, and weeklong period. This planning involves writing down what you need to do and chunking it accordingly. For instance, you would make a list of all the steps involved in a given task, think about the time involved in each step, and plan what you can do in a given time period before your attention wanes.    Check off tasks as you do them. This feels good and helps keep you on track.    Avoid procrastination as this requires more sustained attention as you know the deadline is looming.    Don't push yourself so hard that you get frustrated and give up. It is also important to be practical about what you can and cannot do in a given  time period. Don't be hard on yourself if you need more time or more breaks. The point is find what works for you and do that so you optimize performance.    Pay attention to your own vocational and personal interests in a task. It may be that you have more sustained attention for some tasks and not for others.    Set up structure so you aren't tempted to immediately distract yourself. This may mean unplugging the television, turning off the computer, and/or going somewhere (e.g., coffee shop, library) to limit distractions.      Divided Attention/Multi-tasking  Most people have limited divided attention and research has shown that we are less effective when we are doing several things at once (e.g., checking email, reading online, & watching television). Try to methodically engage in one task at a time to limit problems with divided attention.    Set up some organizers so you can move from one separate task to another.    Have visual cues to keep you on track when you have a lot to do. This will re-orient you and remind you. For instance you may be doing something, get distracted, but then see your notepad that has your next task.    External Strategies  Modifying the physical space to improve attention. You may want to think about how your house, office, or the classroom impact your attention. If you can't modify something, then think about how other means. For instance, you may not be able to make your house quiet if you have children, but getting some ear plugs or ambient noise may improve this. Or, post a do not disturb sign when you need some space for studying or work.    Get organized to reduce distractibility! Develop files at home, on your computer, and even with your email.    Social support is important and many people have or have had attention problems. Talk with your family, coworkers, classmates about what they can do to help you. They may have even developed their own strategies that prove  useful to you.    If you can afford to, then think about electronic devices. They have alarms to remind you along with voice recorders. Keep a planner with you or use your phone planner       Anxiety Coping Strategies: Try these when you're feeling anxious or stressed:   Stress management: Limit potential triggers by managing stress levels. Keep an eye on pressures and deadlines, organize daunting tasks in to-do lists, and take enough time off from professional or educational obligations.   Take a time-out. Practice yoga, listen to music, meditate, get a massage, or learn relaxation techniques. Stepping back from the problem helps clear your head.   Eat well-balanced meals. Do not skip any meals. Do keep healthful, energy-boosting snacks on hand.   Limit alcohol and caffeine, which can aggravate anxiety and trigger panic attacks.   Get enough sleep. When stressed, your body needs additional sleep and rest.   Exercise daily: Physical exertion and an active lifestyle can improve self-image and trigger the release of chemicals in the brain that stimulate positive emotions.   Welcome humor. A good laugh goes a long way.   Maintain a positive attitude. Make an effort to replace negative thoughts with positive ones.   Get involved. Volunteer or find another way to be active in your community, which creates a support network and gives you a break from everyday stress.   Learn what triggers your anxiety. Is it work, family, school, or something else you can identify? Write in a journal when youre feeling stressed or anxious, and look for a pattern.   Support network: Talk to a person who is supportive, such as a family member or friend. Avoid storing up and suppressing anxious feelings as this can worsen anxiety disorders.   Relaxation techniques: Certain measures can help reduce signs of anxiety, including deep-breathing exercises, long baths, meditation, yoga, and resting in the dark.   Exercises to replace  negative thoughts with positive ones: Write down a list of any negative thoughts, and make another list of positive thoughts to replace them. Picturing yourself successfully facing and conquering a specific fear can also provide benefits if the anxiety symptoms link to a specific stressor.   Slow breathing. When youre anxious, your breathing becomes faster and shallower. Try deliberately slowing down your breathing. Count to three as you breathe in slowly - then count to three as you breathe out slowly.   Progressive muscle relaxation. Find a quiet location. Close your eyes and slowly tense and then relax each of your muscle groups from your toes to your head. Hold the tension for three seconds and then release quickly. This can help reduce the feelings of muscle tension that often comes with anxiety.   Stay in the present moment. Anxiety can make your thoughts live in a terrible future that hasnt happened yet. Try to bring yourself back to where you are. Practice a grounding technique: Name 5 things you can see, 4 things you can hear, 3 things you can feel, 2 things you can smell, and 1 thing you can taste.   Healthy lifestyle. Keeping active, eating well, going out into nature, spending time with family and friends, reducing stress and doing the activities you enjoy are all effective in reducing anxiety and improving your wellbeing.      Take small acts of bravery. Avoiding what makes you anxious provides some relief in the short term, but can make you more anxious in the long term. Try approaching something that makes you anxious - even in a small way. The way through anxiety is by learning that what you fear isnt likely to happen - and if it does, youll be able to cope with it.   Challenge your self-talk. How you think affects how you feel. Anxiety can make you overestimate the danger in a situation and underestimate your ability to handle it. Try to think of different interpretations to a situation  thats making you anxious, rather than jumping to the worst-case scenario. Look at the facts for and against your thought being true.   Plan worry time. Its hard to stop worrying entirely so set aside some time to indulge your worries. Even 10 minutes each evening to write them down or go over them in your head can help stop your worries from taking over at other times.   Get to know your anxiety. Keep a diary of when its at its best - and worst. Find the patterns and plan your week - or day - to proactively manage your anxiety.   Learn from others. Talking with others who also experience anxiety - or are going through something similar - can help you feel less alone.    Be kind to yourself. Remember that you are not your anxiety. You are not weak. You are not inferior. You have a mental health condition. Its called anxiety.     What is mindfulness?  Mindfulness is the basic human ability to be fully present, aware of where we are and what were doing, and not overly reactive or overwhelmed by whats going on around us.    While mindfulness is something we all naturally possess, its more readily available to us when we practice on a daily basis.    Whenever you bring awareness to what youre directly experiencing via your senses, or to your state of mind via your thoughts and emotions, youre being mindful. And theres growing research showing that when you train your brain to be mindful, youre actually remodeling the physical structure of your brain.    What is meditation?  Meditation is exploring. Its not a fixed destination. Your head doesnt become vacuumed free of thought, utterly undistracted. When we meditate we venture into the workings of our minds: our sensations (air blowing on our skin or a harsh smell wafting into the room), our emotions (love this, hate that, crave this, loathe that) and thoughts (wouldnt it be weird to see an elephant playing a trumpet).    Mindfulness meditation asks us  "to suspend judgment and unleash our natural curiosity about the workings of the mind, approaching our experience with warmth and kindness, to ourselves and others.    How do I practice mindfulness and meditation?  Mindfulness is available to us in every moment, whether through meditations and body scans, or mindful moment practices like taking time to pause and breathe when the phone rings instead of rushing to answer it. Reminding yourself to take notice of your thoughts, feelings, body sensations and the world around you is the first step to mindfulness.    Notice the everyday  "Even as we go about our daily lives, we can notice the sensations of things, the food we eat, the air moving past the body as we walk," says Professor Guaman. "All this may sound very small, but it has huge power to interrupt the 'autopilot' mode we often engage day to day, and to give us new perspectives on life."    Keep it regular  It can be helpful to pick a regular time - the morning journey to work or a walk at lunchtime - during which you decide to be aware of the sensations created by the world around you.    Try something new  Trying new things, such as sitting in a different seat in meetings or going somewhere new for lunch, can also help you notice the world in a new way.    Watch your thoughts  "Some people find it very difficult to practice mindfulness. As soon as they stop what they're doing, lots of thoughts and worries crowd in," says Professor Guaman.    "It might be useful to remember that mindfulness isn't about making these thoughts go away, but rather about seeing them as mental events.    "Imagine standing at a bus station and seeing 'thought buses' coming and going without having to get on them and be taken away. This can be very hard at first, but with gentle persistence it is possible.    "Some people find that it is easier to cope with an over-busy mind if they are doing gentle yoga or walking."    Name " "thoughts and feelings  To develop an awareness of thoughts and feelings, some people find it helpful to silently name them: "Here's the thought that I might fail that exam". Or, "This is anxiety".    Free yourself from the past and future  You can practise mindfulness anywhere, but it can be especially helpful to take a mindful approach if you realise that, for several minutes, you have been "trapped" in reliving past problems or "pre-living" future worries.    Different mindfulness practices  As well as practising mindfulness in daily life, it can be helpful to set aside time for a more formal mindfulness practice.    Mindfulness meditation involves sitting silently and paying attention to thoughts, sounds, the sensations of breathing or parts of the body, bringing your attention back whenever the mind starts to wander.    Yoga and jackelyn-chi can also help with developing awareness of your breathing.    Mindfulness Resources:  www.mindful.org  [employer] Marissa (free access to HeadSpace Plus in 2020 for healthcare providers with an NPI number)  Calm Marissa    Mindfulness Books:  Mindfulness for Beginners, by Vincent Palacios  The Mindful Way Through Anxiety, by Damaris Benz  The Little Book of Mindfulness, by Eva Barlow    Material adapted from: https://www.nhs.uk/conditions/stress-anxiety-depression/mindfulness/ and mindful.org        Sleep Hygiene:      Avoid watching TV, eating, and discussing emotional issues in bed. The bed should be used for sleep and sex only. If not, we can associate the bed with other activities and it often becomes difficult to fall asleep.   Minimize noise, light, and temperature extremes during sleep with ear plugs, window blinds, or an electric blanket or air conditioner. Even the slightest nighttime noises or luminescent lights can disrupt the quality of your sleep. Try to keep your bedroom at a comfortable temperature -- not too hot (above 75 degrees) or too cold " "(below 54 degrees).   Try to go to bed and wake up at the same time every day. A strict routine can help encourage stability in your circadian rhythm. Get out of bed even if you're still tired - sleeping in much later will make it harder to fall asleep the next night, and the cycle will continue. You may need to run a "sleep deficit" for a day to help you fall asleep more easily.     Do not watch TV in bed, and do not fall asleep to TV. Many people say leaving the TV on helps them fall asleep. However, the flickering "blue light" released by screens  is absorbed even through closed eyelids, and suppresses melatonin release in your brain. This can cause us to linger in the lightest stages of sleep, and miss out on more restorative, deeper sleep. A better option might be a white noise machine or mariaa without any light.    Try not to drink fluids after 8 p.m. This may reduce awakenings due to urination.   Avoid naps, but if you do nap, make it no more than about 25 minutes about eight hours after you awake. But if you have problems falling asleep, then no naps for you.   Do not expose your self to bright light if you need to get up at night. Use a small night-light instead. Blue light can be particularly detrimental, including the light from your cellphone, TV, or computer screen.   Nicotine is a stimulant and should be avoided particularly near bedtime and upon night awakenings. Having a smoke before bed, although it may feel relaxing, is actually putting a stimulant into your bloodstream.   Caffeine is also a stimulant and is present in coffee (100-200 mg), soda (50-75 mg), tea (50-75 mg), and various over-the-counter medications. Caffeine should be discontinued at least four to six hours before bedtime. If you consume large amounts of caffeine and you cut your self off too quickly, beware; you may get headaches that could keep you awake. Sometimes people feel they are "immune" to the effects of caffeine, and " "that a warm cup of coffee before bed actually helps them relax. But similar to nicotine, despite that it may feel relaxing in the moment, you are still consuming a potent stimulant, and it will act on your nervous system throughout the night causing restless and disrupted sleep even if you don't notice feeling jittery at bedtime.    Avoid alcohol in the evenings. Although alcohol is a depressant and may help you fall asleep, the subsequent metabolism that clears it from your body when you are sleeping causes a withdrawal syndrome. This withdrawal causes awakenings and is often associated with nightmares and sweats.   A light snack may be sleep-inducing, but a heavy meal too close to bedtime interferes with sleep. Stay away from protein and stick to carbohydrates or dairy products. Milk contains the amino acid L-tryptophan, which has been shown in research to help people go to sleep. So milk and cookies or crackers (without chocolate) may be useful and taste good as well.   Be active during the day. Get regular exercise, help burn off excess energy, and promote more sound sleep at night.    Try to get outside into the sunlight at least once per day (with sunscreen, of course!). Your circadian rhythm is primarily regulated by the light coming through your eyes, so making sure it's fully dark at night and making sure you get some sunlight during the day can reinforce your circadian rhythm.    Use mindfulness or meditation strategies to quiet a busy mind. Many people report having difficulty sleeping due to being unable "turn off" their minds. Practicing mindfulness exercises before bed - like Progressive Muscle Relaxation or a body scan - can help promote relaxation and aid in anxiety reduction. Apps such as Medrio and Calm can be useful, and there are many freely available mindfulness videos on [employer]. If anxiety continues to interfere with your sleep, seeing a behavioral health professional to learn anxiety " reduction strategies can be helpful.    If you are still struggling with sleep, or struggling to implement any of these tips, behavioral health professionals who are specially trained in sleep medicine can be helpful. Modalities such as Cognitive Behavioral Therapy for Insomnia (CBT-I) can be particularly useful.

## 2020-09-22 NOTE — PROGRESS NOTES
NEUROPSYCHOLOGICAL EVALUATION FEEDBACK    Jose Rodríguez attended a feedback session today.  We discussed the results of the neuropsychological evaluation (33 minutes).  I provided Mr. Rodríguez with a handouts in the AVS. All of his questions were answered.    Marlin Howell PsyD  Licensed Clinical Neuropsychologist  [facility] - Department of Neurology    Patient location: [place]

## 2020-10-02 ENCOUNTER — PATIENT MESSAGE (OUTPATIENT)
Dept: NEUROLOGY | Facility: CLINIC | Age: 70
End: 2020-10-02

## 2020-10-12 ENCOUNTER — TELEPHONE (OUTPATIENT)
Dept: FAMILY MEDICINE | Facility: CLINIC | Age: 70
End: 2020-10-12

## 2020-10-12 ENCOUNTER — PATIENT MESSAGE (OUTPATIENT)
Dept: FAMILY MEDICINE | Facility: CLINIC | Age: 70
End: 2020-10-12

## 2020-10-12 NOTE — TELEPHONE ENCOUNTER
See the message below  I have not called him yet    I know there is an 1120 tomorrow !    Please advise

## 2020-10-12 NOTE — TELEPHONE ENCOUNTER
----- Message from June Pendleton sent at 10/12/2020  1:18 PM CDT -----  Regarding: call back  Contact: [phone]  Patient is requesting to talk to nurse in regards to having severe weight loss and request to see his PCP this week.

## 2020-10-12 NOTE — TELEPHONE ENCOUNTER
There is an early a message that takes up the 11:  20 appointment    Schedule to 340 are 4:00 p.m.

## 2020-10-13 ENCOUNTER — OFFICE VISIT (OUTPATIENT)
Dept: FAMILY MEDICINE | Facility: CLINIC | Age: 70
End: 2020-10-13
Payer: MEDICARE

## 2020-10-13 VITALS
OXYGEN SATURATION: 96 % | TEMPERATURE: 99 F | BODY MASS INDEX: 28.6 KG/M2 | HEIGHT: 73 IN | DIASTOLIC BLOOD PRESSURE: 70 MMHG | SYSTOLIC BLOOD PRESSURE: 114 MMHG | HEART RATE: 81 BPM | WEIGHT: 215.81 LBS

## 2020-10-13 DIAGNOSIS — R53.83 FATIGUE, UNSPECIFIED TYPE: Primary | ICD-10-CM

## 2020-10-13 DIAGNOSIS — R63.4 WEIGHT LOSS: ICD-10-CM

## 2020-10-13 PROCEDURE — 3008F PR BODY MASS INDEX (BMI) DOCUMENTED: ICD-10-PCS | Mod: CPTII,S$GLB,, | Performed by: FAMILY MEDICINE

## 2020-10-13 PROCEDURE — 1100F PTFALLS ASSESS-DOCD GE2>/YR: CPT | Mod: CPTII,S$GLB,, | Performed by: FAMILY MEDICINE

## 2020-10-13 PROCEDURE — 1159F MED LIST DOCD IN RCRD: CPT | Mod: S$GLB,,, | Performed by: FAMILY MEDICINE

## 2020-10-13 PROCEDURE — 99213 OFFICE O/P EST LOW 20 MIN: CPT | Mod: S$GLB,,, | Performed by: FAMILY MEDICINE

## 2020-10-13 PROCEDURE — 1100F PR PT FALLS ASSESS DOC 2+ FALLS/FALL W/INJURY/YR: ICD-10-PCS | Mod: CPTII,S$GLB,, | Performed by: FAMILY MEDICINE

## 2020-10-13 PROCEDURE — 3288F PR FALLS RISK ASSESSMENT DOCUMENTED: ICD-10-PCS | Mod: CPTII,S$GLB,, | Performed by: FAMILY MEDICINE

## 2020-10-13 PROCEDURE — 3288F FALL RISK ASSESSMENT DOCD: CPT | Mod: CPTII,S$GLB,, | Performed by: FAMILY MEDICINE

## 2020-10-13 PROCEDURE — 3008F BODY MASS INDEX DOCD: CPT | Mod: CPTII,S$GLB,, | Performed by: FAMILY MEDICINE

## 2020-10-13 PROCEDURE — 99213 PR OFFICE/OUTPT VISIT, EST, LEVL III, 20-29 MIN: ICD-10-PCS | Mod: S$GLB,,, | Performed by: FAMILY MEDICINE

## 2020-10-13 PROCEDURE — 1126F AMNT PAIN NOTED NONE PRSNT: CPT | Mod: S$GLB,,, | Performed by: FAMILY MEDICINE

## 2020-10-13 PROCEDURE — 1159F PR MEDICATION LIST DOCUMENTED IN MEDICAL RECORD: ICD-10-PCS | Mod: S$GLB,,, | Performed by: FAMILY MEDICINE

## 2020-10-13 PROCEDURE — 1126F PR PAIN SEVERITY QUANTIFIED, NO PAIN PRESENT: ICD-10-PCS | Mod: S$GLB,,, | Performed by: FAMILY MEDICINE

## 2020-10-13 NOTE — PROGRESS NOTES
" Patient ID: Jose Rodríguez is a 70 y.o. male.    Chief Complaint: Weight Loss, Fatigue, Abdominal Pain, and vit d level low    HPI       Jose Rodríguez is a 70 y.o. male here with a recent onset of acute weight loss.  He has been losing weight recently a pound every few weeks.  It appears that over the last several weeks he has had a more precipitous weight loss.  His weight has risen over last two days.  Patient also with increased fatigue lately with wanting to sleep more often.  Mild abdominal pain.  Continues to be worked up for Parkinson's type syndrome.  Seeing neurologist in movement disorder clinic no definitive diagnosis at this moment.      Review of Symptoms    Constitutional  Neg activity change, No chills/ fever   Resp  Neg hemoptysis, stridor, choking  CVS  Neg chest pain, palpitations        Physical Exam    Vitals:    10/13/20 1619   BP: 114/70   BP Location: Left arm   Patient Position: Sitting   Pulse: 81   Temp: 98.6 °F (37 °C)   TempSrc: Oral   SpO2: 96%   Weight: 97.9 kg (215 lb 13.3 oz)   Height: 6' 1" (1.854 m)        Constitutional:   Oriented to person, place, and time.appears well-developed and well-nourished.   No distress.     HENT:   Head: Normocephalic and atraumatic.     Right Ear: Tympanic membrane, external ear and ear canal normal     Left Ear: Tympanic membrane, external ear and ear canal normal    Nose: External Normal. Normal turbinates, No rhinorrhea     Mouth/Throat: Uvula is midline, oropharynx is clear and moist and mucous membranes are normal.     Neck: supple no anterior cervical adenopathy    Carotid artery:  Bruit    NEG []   POS[]    Eyes:   Conjunctivae are normal. Right eye exhibits no discharge. Left eye exhibits no discharge. No scleral icterus. No periorbital edema     Cardiovascular:  Regular rate and rhythm with normal S1 and S2     Pulmonary/Chest:   Clear to auscultation bilaterally without wheezes, rhonchi or rales    Musculoskeletal:  No edema. No " obvious deformity No wasting     Neurological:   Alert and oriented to person, place, and time.     Skin:   Skin is warm and dry.   No diaphoresis.   No rash noted.    Psychiatric: Normal mood and affect. Behavior is normal. Judgment and thought content normal.       Assessment / Plan:      ICD-10-CM ICD-9-CM   1. Fatigue, unspecified type  R53.83 780.79   2. Weight loss  R63.4 783.21     Fatigue, unspecified type  Comments:  Slight improvement    Weight loss  Comments:  Up 2 pounds recently.      Concerned about weight loss-concerned another disease process possibly neoplasm.    Discussed if continue weight loss what we would consider-at this time observe-increase fluids-fatigue glass weight loss stabilized.

## 2020-10-18 ENCOUNTER — PATIENT MESSAGE (OUTPATIENT)
Dept: FAMILY MEDICINE | Facility: CLINIC | Age: 70
End: 2020-10-18

## 2020-10-21 ENCOUNTER — TELEPHONE (OUTPATIENT)
Dept: NEUROLOGY | Facility: CLINIC | Age: 70
End: 2020-10-21

## 2020-10-21 ENCOUNTER — PATIENT MESSAGE (OUTPATIENT)
Dept: FAMILY MEDICINE | Facility: CLINIC | Age: 70
End: 2020-10-21

## 2020-10-21 DIAGNOSIS — D89.89 AUTOIMMUNE DISORDER: Primary | ICD-10-CM

## 2020-10-21 NOTE — TELEPHONE ENCOUNTER
----- Message from Shiela Prater MA sent at 10/21/2020  1:29 PM CDT -----  Regarding: FW: appt  Contact: pt @ [phone]    ----- Message -----  From: Karina Riuz  Sent: 10/21/2020  12:24 PM CDT  To: [facility] Staff  Subject: appt                                             Pt calling to schedule an appt with Dr. Espana, patient is scheduled with , but he would prefer Dr. Espana. Please call

## 2020-10-22 NOTE — TELEPHONE ENCOUNTER
Called patient no answer voicemail left informing patient he has the best appointment with Dr. Ybarra and to keep his appointment with her.

## 2020-12-23 ENCOUNTER — PATIENT MESSAGE (OUTPATIENT)
Dept: FAMILY MEDICINE | Facility: CLINIC | Age: 70
End: 2020-12-23

## 2022-01-10 ENCOUNTER — PATIENT MESSAGE (OUTPATIENT)
Dept: ADMINISTRATIVE | Facility: HOSPITAL | Age: 72
End: 2022-01-10
Payer: MEDICARE

## 2022-02-21 ENCOUNTER — PATIENT MESSAGE (OUTPATIENT)
Dept: NEUROLOGY | Facility: CLINIC | Age: 72
End: 2022-02-21
Payer: MEDICARE

## 2022-05-05 ENCOUNTER — PATIENT MESSAGE (OUTPATIENT)
Dept: RESEARCH | Facility: HOSPITAL | Age: 72
End: 2022-05-05
Payer: MEDICARE

## 2022-05-31 ENCOUNTER — PATIENT MESSAGE (OUTPATIENT)
Dept: ADMINISTRATIVE | Facility: HOSPITAL | Age: 72
End: 2022-05-31
Payer: MEDICARE

## 2022-07-13 ENCOUNTER — TELEPHONE (OUTPATIENT)
Dept: FAMILY MEDICINE | Facility: CLINIC | Age: 72
End: 2022-07-13
Payer: MEDICARE

## 2022-07-13 NOTE — TELEPHONE ENCOUNTER
Spoke to patient. Patient needs surgical clearance. His toes on each foot have curled down and so he is having rods placed in each toe to help straighten them out. Patient was only told that he need a physical and an EKG. Patient has no set surgery date as he was explained that they will schedule once he sees his PCP and he is cleared for surgery.    Where may I place this patient?

## 2022-07-13 NOTE — TELEPHONE ENCOUNTER
----- Message from Claudia Birmingham sent at 7/13/2022  1:51 PM CDT -----  Type:  Sooner Apoointment Request    Caller is requesting a sooner appointment.  Caller declined first available appointment listed below.  Caller will not accept being placed on the waitlist and is requesting a message be sent to doctor.  Name of Caller:Pt  When is the first available appointment?9/9/2022  Symptoms: Physical  Would the patient rather a call back or a response via [facility]? call  Best Call Back Number: [phone]  Additional Information: n/a

## 2022-07-15 ENCOUNTER — OFFICE VISIT (OUTPATIENT)
Dept: FAMILY MEDICINE | Facility: CLINIC | Age: 72
End: 2022-07-15
Payer: MEDICARE

## 2022-07-15 VITALS
HEART RATE: 73 BPM | DIASTOLIC BLOOD PRESSURE: 66 MMHG | WEIGHT: 212.75 LBS | BODY MASS INDEX: 28.2 KG/M2 | SYSTOLIC BLOOD PRESSURE: 96 MMHG | TEMPERATURE: 98 F | HEIGHT: 73 IN | OXYGEN SATURATION: 96 %

## 2022-07-15 DIAGNOSIS — Z01.818 PREOPERATIVE EXAMINATION: Primary | ICD-10-CM

## 2022-07-15 DIAGNOSIS — G20.A1 PARKINSONS: ICD-10-CM

## 2022-07-15 PROCEDURE — 3074F SYST BP LT 130 MM HG: CPT | Mod: CPTII,S$GLB,, | Performed by: FAMILY MEDICINE

## 2022-07-15 PROCEDURE — 3074F PR MOST RECENT SYSTOLIC BLOOD PRESSURE < 130 MM HG: ICD-10-PCS | Mod: CPTII,S$GLB,, | Performed by: FAMILY MEDICINE

## 2022-07-15 PROCEDURE — 3078F PR MOST RECENT DIASTOLIC BLOOD PRESSURE < 80 MM HG: ICD-10-PCS | Mod: CPTII,S$GLB,, | Performed by: FAMILY MEDICINE

## 2022-07-15 PROCEDURE — 1159F PR MEDICATION LIST DOCUMENTED IN MEDICAL RECORD: ICD-10-PCS | Mod: CPTII,S$GLB,, | Performed by: FAMILY MEDICINE

## 2022-07-15 PROCEDURE — 1126F AMNT PAIN NOTED NONE PRSNT: CPT | Mod: CPTII,S$GLB,, | Performed by: FAMILY MEDICINE

## 2022-07-15 PROCEDURE — 99213 OFFICE O/P EST LOW 20 MIN: CPT | Mod: S$GLB,,, | Performed by: FAMILY MEDICINE

## 2022-07-15 PROCEDURE — 3008F BODY MASS INDEX DOCD: CPT | Mod: CPTII,S$GLB,, | Performed by: FAMILY MEDICINE

## 2022-07-15 PROCEDURE — 3288F FALL RISK ASSESSMENT DOCD: CPT | Mod: CPTII,S$GLB,, | Performed by: FAMILY MEDICINE

## 2022-07-15 PROCEDURE — 93010 ELECTROCARDIOGRAM REPORT: CPT | Mod: S$GLB,,, | Performed by: INTERNAL MEDICINE

## 2022-07-15 PROCEDURE — 3078F DIAST BP <80 MM HG: CPT | Mod: CPTII,S$GLB,, | Performed by: FAMILY MEDICINE

## 2022-07-15 PROCEDURE — 1126F PR PAIN SEVERITY QUANTIFIED, NO PAIN PRESENT: ICD-10-PCS | Mod: CPTII,S$GLB,, | Performed by: FAMILY MEDICINE

## 2022-07-15 PROCEDURE — 93005 ELECTROCARDIOGRAM TRACING: CPT | Mod: S$GLB,,, | Performed by: FAMILY MEDICINE

## 2022-07-15 PROCEDURE — 1101F PR PT FALLS ASSESS DOC 0-1 FALLS W/OUT INJ PAST YR: ICD-10-PCS | Mod: CPTII,S$GLB,, | Performed by: FAMILY MEDICINE

## 2022-07-15 PROCEDURE — 99213 PR OFFICE/OUTPT VISIT, EST, LEVL III, 20-29 MIN: ICD-10-PCS | Mod: S$GLB,,, | Performed by: FAMILY MEDICINE

## 2022-07-15 PROCEDURE — 93010 EKG 12-LEAD: ICD-10-PCS | Mod: S$GLB,,, | Performed by: INTERNAL MEDICINE

## 2022-07-15 PROCEDURE — 93005 EKG 12-LEAD: ICD-10-PCS | Mod: S$GLB,,, | Performed by: FAMILY MEDICINE

## 2022-07-15 PROCEDURE — 1159F MED LIST DOCD IN RCRD: CPT | Mod: CPTII,S$GLB,, | Performed by: FAMILY MEDICINE

## 2022-07-15 PROCEDURE — 3008F PR BODY MASS INDEX (BMI) DOCUMENTED: ICD-10-PCS | Mod: CPTII,S$GLB,, | Performed by: FAMILY MEDICINE

## 2022-07-15 PROCEDURE — 3288F PR FALLS RISK ASSESSMENT DOCUMENTED: ICD-10-PCS | Mod: CPTII,S$GLB,, | Performed by: FAMILY MEDICINE

## 2022-07-15 PROCEDURE — 1101F PT FALLS ASSESS-DOCD LE1/YR: CPT | Mod: CPTII,S$GLB,, | Performed by: FAMILY MEDICINE

## 2022-07-15 RX ORDER — OMEPRAZOLE 40 MG/1
CAPSULE, DELAYED RELEASE ORAL
COMMUNITY

## 2022-07-15 RX ORDER — ONABOTULINUMTOXINA 100 [USP'U]/1
INJECTION, POWDER, LYOPHILIZED, FOR SOLUTION INTRADERMAL; INTRAMUSCULAR
COMMUNITY

## 2022-07-15 NOTE — PROGRESS NOTES
" Patient ID: Jose Rodríguez is a 72 y.o. male.    Chief Complaint: Pre-op Exam    HPI      Jose Rodríguez is a 72 y.o. male here for preoperative examination for foot surgery.  Patient with neuropathy and resulting contraction of the toes and feet.    Vitals:    07/15/22 1459   BP: 96/66   BP Location: Right arm   Patient Position: Sitting   Pulse: 73   Temp: 98 °F (36.7 °C)   TempSrc: Oral   SpO2: 96%   Weight: 96.5 kg (212 lb 11.9 oz)   Height: 6' 1" (1.854 m)            Review of Symptoms      Physical Exam    Constitutional:  Oriented to person, place, and time.appears well-developed and well-nourished.  No distress.      HENT  Head: Normocephalic and atraumatic  Right Ear: External ear normal.   Left Ear: External ear normal.   Nose: External nose normal.   Mouth:  Moist mucus membranes.    Eyes:  Conjunctivae are normal. Right eye exhibits no discharge.  Left eye exhibits no discharge. No scleral icterus.  No periorbital edema    Cardiovascular:  Regular rate and rhythm with normal S1 and S2     Pulmonary/Chest:   Clear to auscultation bilaterally without wheezes, rhonchi or rales      Musculoskeletal:  No edema. No obvious deformity No wasting       Neurological:  Alert and oriented to person, place, and time.   Coordination normal.     Skin:   Skin is warm and dry.  No diaphoresis.   No rash noted.     Psychiatric: Normal mood and affect. Behavior is normal.  Judgment and thought content normal.     Complete Blood Count  Lab Results   Component Value Date    RBC 5.02 05/22/2020    HGB 14.9 05/22/2020    HCT 45.9 05/22/2020    MCV 91 05/22/2020    MCH 29.7 05/22/2020    MCHC 32.5 05/22/2020    RDW 13.2 05/22/2020     05/22/2020    MPV 10.3 05/22/2020    GRAN 2.4 05/22/2020    GRAN 49.5 05/22/2020    LYMPH 1.7 05/22/2020    LYMPH 36.1 05/22/2020    MONO 0.6 05/22/2020    MONO 12.8 05/22/2020    EOS 0.0 05/22/2020    BASO 0.03 05/22/2020    EOSINOPHIL 0.8 05/22/2020    BASOPHIL 0.6 05/22/2020    " DIFFMETHOD Automated 05/22/2020       Comprehensive Metabolic Panel  No results found for: GLU, BUN, CREATININE, NA, K, CL, PROT, ALBUMIN, BILITOT, AST, ALKPHOS, CO2, ALT, ANIONGAP, EGFRNONAA, ESTGFRAFRICA    TSH  No results found for: TSH    Assessment / Plan:      ICD-10-CM ICD-9-CM   1. Preoperative examination  Z01.818 V72.84     Preoperative examination  -     IN OFFICE EKG 12-LEAD (to Muse)      EKG within normal limits-patient with no respiratory symptoms or cardiovascular symptoms.  In a great [facility] health at this time will fill out paperwork for upcoming procedure.

## 2022-07-20 PROBLEM — G20.A1 PARKINSONS: Status: ACTIVE | Noted: 2022-07-20

## 2022-07-28 ENCOUNTER — TELEPHONE (OUTPATIENT)
Dept: FAMILY MEDICINE | Facility: CLINIC | Age: 72
End: 2022-07-28
Payer: MEDICARE

## 2022-07-28 NOTE — TELEPHONE ENCOUNTER
Spoke to keyla she stated she would like the image of the EKG for when pt is in surgery just incase anaesthesia needs to review. Faxed image to below fax number    ----- Message from Maria Guadalupe Villegas sent at 7/28/2022  1:31 PM CDT -----  Contact: [email or web address] at [phone]  Type:  Needs Medical Advice    Who Called: [email or web address]    Reason for call: regarding a Image or a Tracing of the pt's EKG on 7/19  Would the patient rather a call back or a response via [facility]?  Call back  Best Call Back Number: [phone] or fax [phone]

## 2023-02-07 DIAGNOSIS — Z00.00 ENCOUNTER FOR MEDICARE ANNUAL WELLNESS EXAM: ICD-10-CM

## 2023-02-09 DIAGNOSIS — Z00.00 ENCOUNTER FOR MEDICARE ANNUAL WELLNESS EXAM: ICD-10-CM

## 2023-05-30 ENCOUNTER — TELEPHONE (OUTPATIENT)
Dept: FAMILY MEDICINE | Facility: CLINIC | Age: 73
End: 2023-05-30
Payer: MEDICARE

## 2023-05-30 RX ORDER — SILDENAFIL 100 MG/1
100 TABLET, FILM COATED ORAL DAILY PRN
Qty: 45 TABLET | Refills: 11 | Status: SHIPPED | OUTPATIENT
Start: 2023-05-30 | End: 2024-05-29

## 2023-05-30 NOTE — TELEPHONE ENCOUNTER
----- Message from Ольга Almanzar sent at 5/30/2023  1:46 PM CDT -----  Type:  RX Refill Request    Who Called: Pt  Refill or New Rx:refill  RX Name and Strength:sildenafil (VIAGRA) 100 MG tablet (Discontinued  How is the patient currently taking it? (ex. 1XDay): Route: Take 1 tablet (100 mg total) by mouth daily as needed for Erectile Dysfunction. - Oral  Is this a 30 day or 90 day RX:30  Preferred Pharmacy with phone number:[facility] [address]  Local or Mail Order:mail  Ordering Provider:Aj Patrick  Would the patient rather a call back or a response via [facility]?   Best Call Back Number:  Additional Information: (Discontinued) pt states he is unaware medication was (Discontinued)

## 2023-08-31 NOTE — TELEPHONE ENCOUNTER
Spoke to pt and informed he can come in on tomorrow at 2:40   Opzelura Counseling:  I discussed with the patient the risks of Opzelura including but not limited to nasopharngitis, bronchitis, ear infection, eosinophila, hives, diarrhea, folliculitis, tonsillitis, and rhinorrhea.  Taken orally, this medication has been linked to serious infections; higher rate of mortality; malignancy and lymphoproliferative disorders; major adverse cardiovascular events; thrombosis; thrombocytopenia, anemia, and neutropenia; and lipid elevations.

## 2023-09-07 ENCOUNTER — PATIENT OUTREACH (OUTPATIENT)
Dept: ADMINISTRATIVE | Facility: HOSPITAL | Age: 73
End: 2023-09-07
Payer: MEDICARE

## 2023-12-05 ENCOUNTER — TELEPHONE (OUTPATIENT)
Dept: FAMILY MEDICINE | Facility: CLINIC | Age: 73
End: 2023-12-05
Payer: MEDICARE

## 2023-12-05 DIAGNOSIS — G47.33 OSA (OBSTRUCTIVE SLEEP APNEA): ICD-10-CM

## 2023-12-05 DIAGNOSIS — R25.1 TREMOR: ICD-10-CM

## 2023-12-05 DIAGNOSIS — G25.81 RLS (RESTLESS LEGS SYNDROME): ICD-10-CM

## 2023-12-05 DIAGNOSIS — Z13.6 SCREENING FOR CARDIOVASCULAR CONDITION: ICD-10-CM

## 2023-12-05 DIAGNOSIS — G70.00 MYASTHENIA GRAVIS: ICD-10-CM

## 2023-12-05 DIAGNOSIS — G20.A1 PARKINSON'S DISEASE, UNSPECIFIED WHETHER DYSKINESIA PRESENT, UNSPECIFIED WHETHER MANIFESTATIONS FLUCTUATE: Primary | ICD-10-CM

## 2023-12-05 DIAGNOSIS — Z00.00 ROUTINE HEALTH MAINTENANCE: ICD-10-CM

## 2023-12-05 DIAGNOSIS — Z12.5 SCREENING PSA (PROSTATE SPECIFIC ANTIGEN): ICD-10-CM

## 2023-12-05 DIAGNOSIS — E55.9 VITAMIN D DEFICIENCY, UNSPECIFIED: ICD-10-CM

## 2023-12-05 DIAGNOSIS — F48.2 PSEUDOBULBAR AFFECT: ICD-10-CM

## 2023-12-05 NOTE — TELEPHONE ENCOUNTER
----- Message from Maureen Pepe sent at 12/5/2023  9:21 AM CST -----  Regarding: sooner  Contact: [phone]  Type:  Sooner Apoointment Request    Caller is requesting a sooner appointment.  Caller is requesting a message be sent to doctor.  Name of Caller: pt   When is the first available appointment? None   Symptoms: Annual and discuss parkinson's advancing.   Would the patient rather a call back or a response via [facility]?  Call   Best Call Back Number:  [phone]  Additional Information:  annual lab orders as well

## 2023-12-05 NOTE — TELEPHONE ENCOUNTER
Pt is inform that dr place orders for annual labs prior to appt.Pt goes to [facility] so labs orders was change to [facility].

## 2023-12-09 LAB
ALBUMIN: 4.3 G/DL (ref 3.4–5)
ALP SERPL-CCNC: 62 U/L (ref 20–120)
ALT SERPL W P-5'-P-CCNC: 4 U/L
ANION GAP SERPL CALC-SCNC: 8 MMOL/L (ref 8–16)
AST SERPL-CCNC: 18 U/L
BASOPHILS ABSOLUTE COUNT: 0 THOUSAND/UL (ref 0–0.2)
BASOPHILS NFR BLD: 0.7 %
BILIRUB SERPL-MCNC: 1 MG/DL
BUN BLD-MCNC: 24 MG/DL (ref 7–25)
CALCIUM SERPL-MCNC: 8.6 MG/DL (ref 8.4–10.3)
CHLORIDE SERPL-SCNC: 105 MMOL/L (ref 96–110)
CHOL/HDLC RATIO: 4.69 (ref 0–5)
CHOLEST SERPL-MSCNC: 211 MG/DL
CO2 SERPL-SCNC: 29 MMOL/L (ref 24–32)
CREAT SERPL-MCNC: 0.94 MG/DL (ref 0.7–1.4)
EGFR: 86 ML/MIN/1.73M2
EOSINOPHIL NFR BLD: 0.9 %
EOSINOPHILS ABSOLUTE COUNT: 0 THOUSAND/UL (ref 0–0.6)
ERYTHROCYTE [DISTWIDTH] IN BLOOD BY AUTOMATED COUNT: 14.1 % (ref 11.5–14.5)
GLUCOSE SERPL-MCNC: 86 MG/DL (ref 65–99)
HCT VFR BLD AUTO: 41.7 % (ref 40–51)
HDLC SERPL-MCNC: 45 MG/DL (ref 40–59)
HGB BLD-MCNC: 14.1 GM/DL (ref 13.5–17.5)
LDLC SERPL CALC-MCNC: 139 MG/DL
LYMPHOCYTES %: 35.7 %
LYMPHOCYTES ABSOLUTE COUNT: 1.7 THOUSAND/UL (ref 1.1–5)
MCH RBC QN AUTO: 30 PG (ref 26–34)
MCHC RBC AUTO-ENTMCNC: 33.9 G/DL
MCV RBC AUTO: 88.7 FL (ref 80–100)
MONOCYTES %: 10.3 %
MONOCYTES ABSOLUTE COUNT: 0.5 THOUSAND/UL (ref 0.2–1.1)
NEUTROPHILS ABSOLUTE COUNT: 2.5 THOUSAND/UL (ref 1.8–8)
NEUTROPHILS RELATIVE PERCENT: 52.4 %
NON HDL CHOL (CALC): 166
PLATELET # BLD AUTO: 193 THOUSAND/UL (ref 130–400)
PMV BLD AUTO: 8 FL (ref 7.4–10.4)
POTASSIUM SERPL-SCNC: 4.6 MMOL/L (ref 3.6–5.2)
PROSTATE SPECIFIC ANTIGEN, TOTAL: 0.7 NG/ML
RBC # BLD AUTO: 4.7 MILLION/UL (ref 4.5–5.9)
SODIUM BLD-SCNC: 142 MMOL/L (ref 135–146)
TOTAL PROTEIN: 6.7 G/DL (ref 6–8)
TRIGL SERPL-MCNC: 134 MG/DL
TSH SERPL DL<=0.005 MIU/L-ACNC: 1.41 UIU/ML (ref 0.5–5)
WBC # BLD AUTO: 4.8 THOUSAND/UL (ref 4.5–11)

## 2023-12-11 ENCOUNTER — OFFICE VISIT (OUTPATIENT)
Dept: FAMILY MEDICINE | Facility: CLINIC | Age: 73
End: 2023-12-11
Payer: MEDICARE

## 2023-12-11 VITALS
DIASTOLIC BLOOD PRESSURE: 66 MMHG | WEIGHT: 222.56 LBS | HEIGHT: 73 IN | TEMPERATURE: 99 F | SYSTOLIC BLOOD PRESSURE: 112 MMHG | HEART RATE: 69 BPM | BODY MASS INDEX: 29.5 KG/M2 | OXYGEN SATURATION: 98 %

## 2023-12-11 DIAGNOSIS — Z00.00 ROUTINE HEALTH MAINTENANCE: ICD-10-CM

## 2023-12-11 DIAGNOSIS — K59.00 CONSTIPATION, UNSPECIFIED CONSTIPATION TYPE: Primary | ICD-10-CM

## 2023-12-11 DIAGNOSIS — G47.33 OSA (OBSTRUCTIVE SLEEP APNEA): ICD-10-CM

## 2023-12-11 DIAGNOSIS — Z12.11 ENCOUNTER FOR COLORECTAL CANCER SCREENING: ICD-10-CM

## 2023-12-11 DIAGNOSIS — Z12.5 SCREENING PSA (PROSTATE SPECIFIC ANTIGEN): ICD-10-CM

## 2023-12-11 DIAGNOSIS — G47.52 DREAM ENACTMENT BEHAVIOR: ICD-10-CM

## 2023-12-11 DIAGNOSIS — Z12.12 ENCOUNTER FOR COLORECTAL CANCER SCREENING: ICD-10-CM

## 2023-12-11 DIAGNOSIS — Z13.6 SCREENING FOR CARDIOVASCULAR CONDITION: ICD-10-CM

## 2023-12-11 DIAGNOSIS — R47.89 WORD FINDING DIFFICULTY: ICD-10-CM

## 2023-12-11 DIAGNOSIS — G25.81 RLS (RESTLESS LEGS SYNDROME): ICD-10-CM

## 2023-12-11 PROCEDURE — 99214 OFFICE O/P EST MOD 30 MIN: CPT | Mod: S$GLB,,, | Performed by: FAMILY MEDICINE

## 2023-12-11 PROCEDURE — 1100F PR PT FALLS ASSESS DOC 2+ FALLS/FALL W/INJURY/YR: ICD-10-PCS | Mod: CPTII,S$GLB,, | Performed by: FAMILY MEDICINE

## 2023-12-11 PROCEDURE — 1159F MED LIST DOCD IN RCRD: CPT | Mod: CPTII,S$GLB,, | Performed by: FAMILY MEDICINE

## 2023-12-11 PROCEDURE — 99214 PR OFFICE/OUTPT VISIT, EST, LEVL IV, 30-39 MIN: ICD-10-PCS | Mod: S$GLB,,, | Performed by: FAMILY MEDICINE

## 2023-12-11 PROCEDURE — 1100F PTFALLS ASSESS-DOCD GE2>/YR: CPT | Mod: CPTII,S$GLB,, | Performed by: FAMILY MEDICINE

## 2023-12-11 PROCEDURE — 1126F PR PAIN SEVERITY QUANTIFIED, NO PAIN PRESENT: ICD-10-PCS | Mod: CPTII,S$GLB,, | Performed by: FAMILY MEDICINE

## 2023-12-11 PROCEDURE — 3008F PR BODY MASS INDEX (BMI) DOCUMENTED: ICD-10-PCS | Mod: CPTII,S$GLB,, | Performed by: FAMILY MEDICINE

## 2023-12-11 PROCEDURE — 3288F FALL RISK ASSESSMENT DOCD: CPT | Mod: CPTII,S$GLB,, | Performed by: FAMILY MEDICINE

## 2023-12-11 PROCEDURE — 3078F DIAST BP <80 MM HG: CPT | Mod: CPTII,S$GLB,, | Performed by: FAMILY MEDICINE

## 2023-12-11 PROCEDURE — 3288F PR FALLS RISK ASSESSMENT DOCUMENTED: ICD-10-PCS | Mod: CPTII,S$GLB,, | Performed by: FAMILY MEDICINE

## 2023-12-11 PROCEDURE — 3074F SYST BP LT 130 MM HG: CPT | Mod: CPTII,S$GLB,, | Performed by: FAMILY MEDICINE

## 2023-12-11 PROCEDURE — 1159F PR MEDICATION LIST DOCUMENTED IN MEDICAL RECORD: ICD-10-PCS | Mod: CPTII,S$GLB,, | Performed by: FAMILY MEDICINE

## 2023-12-11 PROCEDURE — 1126F AMNT PAIN NOTED NONE PRSNT: CPT | Mod: CPTII,S$GLB,, | Performed by: FAMILY MEDICINE

## 2023-12-11 PROCEDURE — 3008F BODY MASS INDEX DOCD: CPT | Mod: CPTII,S$GLB,, | Performed by: FAMILY MEDICINE

## 2023-12-11 PROCEDURE — 3074F PR MOST RECENT SYSTOLIC BLOOD PRESSURE < 130 MM HG: ICD-10-PCS | Mod: CPTII,S$GLB,, | Performed by: FAMILY MEDICINE

## 2023-12-11 PROCEDURE — 3078F PR MOST RECENT DIASTOLIC BLOOD PRESSURE < 80 MM HG: ICD-10-PCS | Mod: CPTII,S$GLB,, | Performed by: FAMILY MEDICINE

## 2023-12-11 RX ORDER — CITALOPRAM 10 MG/1
1 TABLET ORAL DAILY
COMMUNITY
Start: 2023-10-09

## 2023-12-11 NOTE — PROGRESS NOTES
Patient ID: Jose Rodríguez is a 73 y.o. male.    Chief Complaint: Annual Exam and Tremors    HPI     Jose Rodríguez is a 73 y.o. male. here for annual exam.  Patient's major is Parkinson's disease which is fairly stable this time.  Worsening symptoms in regards to weakness stability.  Having less muscle control and falling occasionally.  However tremors or fairly mild and no mental symptoms.    Mood-stable  Uses Klonopin for REM sleep   Complains of constipation associated with Parkinson's disease.    Review of Symptoms    Constitutional: Negative.    HENT: Negative.    Eyes: Negative.    Respiratory: Negative.    Cardiovascular: Negative.    Gastrointestinal: Negative.    Endocrine: Negative.    Genitourinary: Negative.    Musculoskeletal: Negative.    Skin: Negative.    Allergic/Immunologic: Negative.    Neurological: Negative.    Hematological: Negative.    Psychiatric/Behavioral: Negative.      Except as above in HPI    Current Outpatient Medications on File Prior to Visit   Medication Sig Dispense Refill    carbidopa-levodopa  mg (SINEMET)  mg per tablet Take 1 tablet by mouth 3 (three) times daily. (Patient taking differently: Take by mouth 3 (three) times daily. 1 1/2 tablets) 90 tablet 12    cetirizine (ZYRTEC) 10 MG tablet Take 10 mg by mouth once daily.      citalopram (CELEXA) 10 MG tablet Take 1 tablet by mouth once daily.      clonazePAM (KLONOPIN) 0.5 MG tablet Take 0.5 mg by mouth once daily.       cyanocobalamin (VITAMIN B-12) 1000 MCG tablet Take 100 mcg by mouth once daily.      ergocalciferol, vitamin D2, (VITAMIN D ORAL)       omeprazole (PRILOSEC) 40 MG capsule as needed      onabotulinumtoxina (BOTOX) 100 unit SolR Botox 100 unit injection      sildenafiL (VIAGRA) 100 MG tablet Take 1 tablet (100 mg total) by mouth daily as needed for Erectile Dysfunction. 45 tablet 11    [DISCONTINUED] UNABLE TO FIND every evening. medication name: cbd gummie       No current  "facility-administered medications on file prior to visit.         Physical  Exam    Vitals:    12/11/23 0832   BP: 112/66   BP Location: Left arm   Patient Position: Sitting   Pulse: 69   Temp: 98.8 °F (37.1 °C)   TempSrc: Oral   SpO2: 98%   Weight: 101 kg (222 lb 8.9 oz)   Height: 6' 1" (1.854 m)          Constitutional:  Oriented to person, place, and time. Appears well-developed and well-nourished.     HENT:   Head: Normocephalic and atraumatic.     Right Ear: External ear normal     Left Ear: External ear normal      Nose: Nose normal. No rhinorrhea or nasal deformity.     Mouth/Throat: Moist mucus membranes      Eyes: Conjunctivae are normal. Right eye exhibits no discharge. Left eye exhibits no  discharge. No scleral icterus.     Neck:  No JVD present. No tracheal deviation  []  Neck supple.   Carotid Arteries  []  No Bruit    Cardiovascular:  Regular rate and rhythm with normal S1 and S2     Pulmonary/Chest:   Clear to auscultation bilaterally without wheezes, rhonchi or rales    Abdominal: Soft. No distension and no mass.  No tenderness. No rebound and No guarding.     Musculoskeletal: Normal range of motion. No edema or tenderness.   No deformity     Lymphadenopathy:   []  No cervical adenopathy.  []  No inguinal adenopathy    Neurological:  Alert and oriented to person, place, and time. Coordination normal.     Skin: Skin is warm and dry. No rash noted. No bruising     Psychiatric: Normal mood and affect. Speech is normal and behavior is normal. Judgment and thought content normal.       Assessment / Plan:      ICD-10-CM ICD-9-CM   1. Constipation, unspecified constipation type  K59.00 564.00   2. Encounter for colorectal cancer screening  Z12.11 V76.51    Z12.12 V76.41   3. Routine health maintenance  Z00.00 V70.0   4. PROMISE (obstructive sleep apnea)  G47.33 327.23   5. Screening for cardiovascular condition  Z13.6 V81.2   6. Screening PSA (prostate specific antigen)  Z12.5 V76.44   7. RLS (restless legs " syndrome)  G25.81 333.94   8. Dream enactment behavior  G47.52 327.42   9. Word finding difficulty  R47.89 V40.1     Constipation, unspecified constipation type    Encounter for colorectal cancer screening  -     Cologuard Screening (Multitarget Stool DNA); Future; Expected date: 12/11/2023    Routine health maintenance  -     Cancel: Comprehensive Metabolic Panel; Future  -     Cancel: CBC Auto Differential; Future  -     Cancel: Lipid Panel; Future  -     Comprehensive Metabolic Panel; Future; Expected date: 12/11/2023  -     CBC Auto Differential; Future; Expected date: 12/11/2023  -     Lipid Panel; Future; Expected date: 12/11/2023    PROMISE (obstructive sleep apnea)  -     Cancel: Comprehensive Metabolic Panel; Future  -     Cancel: CBC Auto Differential; Future  -     Cancel: Lipid Panel; Future  -     Comprehensive Metabolic Panel; Future; Expected date: 12/11/2023  -     CBC Auto Differential; Future; Expected date: 12/11/2023  -     Lipid Panel; Future; Expected date: 12/11/2023    Screening for cardiovascular condition  -     Cancel: Comprehensive Metabolic Panel; Future  -     Cancel: CBC Auto Differential; Future  -     Cancel: Lipid Panel; Future  -     Comprehensive Metabolic Panel; Future; Expected date: 12/11/2023  -     CBC Auto Differential; Future; Expected date: 12/11/2023  -     Lipid Panel; Future; Expected date: 12/11/2023    Screening PSA (prostate specific antigen)  -     Cancel: PSA, Screening; Future  -     PSA, Screening; Future; Expected date: 12/11/2023    RLS (restless legs syndrome)    Dream enactment behavior    Word finding difficulty    Other orders  -     linaCLOtide (LINZESS) 72 mcg Cap capsule; Take 1 capsule (72 mcg total) by mouth before breakfast.  Dispense: 90 capsule; Refill: 3    Parkinson's-continue current medications work with neurologist   In regards to constipation-trial of Linzess most people take it a few times a week or every other day.    Continue current  medications    Also need to fill out concealed carry form    Discussed how to stay healthy             Aj Barraza M.D.

## 2023-12-12 ENCOUNTER — TELEPHONE (OUTPATIENT)
Dept: FAMILY MEDICINE | Facility: CLINIC | Age: 73
End: 2023-12-12
Payer: MEDICARE

## 2024-01-03 LAB — NONINV COLON CA DNA+OCC BLD SCRN STL QL: NEGATIVE

## 2024-12-16 ENCOUNTER — TELEPHONE (OUTPATIENT)
Dept: FAMILY MEDICINE | Facility: CLINIC | Age: 74
End: 2024-12-16

## 2024-12-16 ENCOUNTER — OFFICE VISIT (OUTPATIENT)
Dept: FAMILY MEDICINE | Facility: CLINIC | Age: 74
End: 2024-12-16
Payer: MEDICARE

## 2024-12-16 VITALS
BODY MASS INDEX: 29.41 KG/M2 | HEART RATE: 77 BPM | OXYGEN SATURATION: 96 % | WEIGHT: 221.88 LBS | HEIGHT: 73 IN | TEMPERATURE: 97 F | DIASTOLIC BLOOD PRESSURE: 80 MMHG | SYSTOLIC BLOOD PRESSURE: 132 MMHG

## 2024-12-16 DIAGNOSIS — Z12.5 SCREENING PSA (PROSTATE SPECIFIC ANTIGEN): ICD-10-CM

## 2024-12-16 DIAGNOSIS — R39.9 LOWER URINARY TRACT SYMPTOMS (LUTS): ICD-10-CM

## 2024-12-16 DIAGNOSIS — K59.00 CONSTIPATION, UNSPECIFIED CONSTIPATION TYPE: ICD-10-CM

## 2024-12-16 DIAGNOSIS — E55.9 VITAMIN D DEFICIENCY: ICD-10-CM

## 2024-12-16 DIAGNOSIS — Z13.6 SCREENING FOR CARDIOVASCULAR CONDITION: ICD-10-CM

## 2024-12-16 DIAGNOSIS — Z00.00 ROUTINE HEALTH MAINTENANCE: Primary | ICD-10-CM

## 2024-12-16 DIAGNOSIS — G20.B2 PARKINSON'S DISEASE WITH DYSKINESIA AND FLUCTUATING MANIFESTATIONS: ICD-10-CM

## 2024-12-16 PROCEDURE — 3288F FALL RISK ASSESSMENT DOCD: CPT | Mod: CPTII,S$GLB,, | Performed by: FAMILY MEDICINE

## 2024-12-16 PROCEDURE — 3079F DIAST BP 80-89 MM HG: CPT | Mod: CPTII,S$GLB,, | Performed by: FAMILY MEDICINE

## 2024-12-16 PROCEDURE — 1125F AMNT PAIN NOTED PAIN PRSNT: CPT | Mod: CPTII,S$GLB,, | Performed by: FAMILY MEDICINE

## 2024-12-16 PROCEDURE — 1100F PTFALLS ASSESS-DOCD GE2>/YR: CPT | Mod: CPTII,S$GLB,, | Performed by: FAMILY MEDICINE

## 2024-12-16 PROCEDURE — 99397 PER PM REEVAL EST PAT 65+ YR: CPT | Mod: S$GLB,,, | Performed by: FAMILY MEDICINE

## 2024-12-16 PROCEDURE — 1160F RVW MEDS BY RX/DR IN RCRD: CPT | Mod: CPTII,S$GLB,, | Performed by: FAMILY MEDICINE

## 2024-12-16 PROCEDURE — 1159F MED LIST DOCD IN RCRD: CPT | Mod: CPTII,S$GLB,, | Performed by: FAMILY MEDICINE

## 2024-12-16 PROCEDURE — 3008F BODY MASS INDEX DOCD: CPT | Mod: CPTII,S$GLB,, | Performed by: FAMILY MEDICINE

## 2024-12-16 PROCEDURE — 3075F SYST BP GE 130 - 139MM HG: CPT | Mod: CPTII,S$GLB,, | Performed by: FAMILY MEDICINE

## 2024-12-16 RX ORDER — ISTRADEFYLLINE 40 MG/1
TABLET, FILM COATED ORAL
COMMUNITY

## 2024-12-16 RX ORDER — TADALAFIL 5 MG/1
5 TABLET ORAL DAILY PRN
Qty: 90 TABLET | Refills: 1 | Status: SHIPPED | OUTPATIENT
Start: 2024-12-16 | End: 2025-12-16

## 2024-12-20 ENCOUNTER — TELEPHONE (OUTPATIENT)
Dept: FAMILY MEDICINE | Facility: CLINIC | Age: 74
End: 2024-12-20
Payer: MEDICARE

## 2024-12-20 NOTE — TELEPHONE ENCOUNTER
----- Message from Radha sent at 12/20/2024  9:13 AM CST -----  Type:  Call    Who Called:pt  Does the patient know what this is regarding?:labs  Would the patient rather a call back or a response via [facility]? call  Best Call Back Number: [phone]  Additional Information: Are the labs sent to [facility] fasting labs?

## 2024-12-24 LAB
ALBUMIN SERPL-MCNC: 4.1 G/DL (ref 3.6–5.1)
ALBUMIN/GLOB SERPL: 1.7 (CALC) (ref 1–2.5)
ALP SERPL-CCNC: 64 U/L (ref 35–144)
ALT SERPL-CCNC: 15 U/L (ref 9–46)
AST SERPL-CCNC: 16 U/L (ref 10–35)
BASOPHILS # BLD AUTO: 28 CELLS/UL (ref 0–200)
BASOPHILS NFR BLD AUTO: 0.6 %
BILIRUB SERPL-MCNC: 1.2 MG/DL (ref 0.2–1.2)
BUN SERPL-MCNC: 19 MG/DL (ref 7–25)
BUN/CREAT SERPL: NORMAL (CALC) (ref 6–22)
CALCIUM SERPL-MCNC: 9.1 MG/DL (ref 8.6–10.3)
CHLORIDE SERPL-SCNC: 103 MMOL/L (ref 98–110)
CHOLEST SERPL-MCNC: 215 MG/DL
CHOLEST/HDLC SERPL: 4.8 (CALC)
CO2 SERPL-SCNC: 30 MMOL/L (ref 20–32)
CREAT SERPL-MCNC: 1 MG/DL (ref 0.7–1.28)
EGFR: 79 ML/MIN/1.73M2
EOSINOPHIL # BLD AUTO: 60 CELLS/UL (ref 15–500)
EOSINOPHIL NFR BLD AUTO: 1.3 %
ERYTHROCYTE [DISTWIDTH] IN BLOOD BY AUTOMATED COUNT: 13.1 % (ref 11–15)
GLOBULIN SER CALC-MCNC: 2.4 G/DL (CALC) (ref 1.9–3.7)
GLUCOSE SERPL-MCNC: 80 MG/DL (ref 65–99)
HCT VFR BLD AUTO: 42.3 % (ref 38.5–50)
HDLC SERPL-MCNC: 45 MG/DL
HGB BLD-MCNC: 14 G/DL (ref 13.2–17.1)
LDLC SERPL CALC-MCNC: 136 MG/DL (CALC)
LYMPHOCYTES # BLD AUTO: 1592 CELLS/UL (ref 850–3900)
LYMPHOCYTES NFR BLD AUTO: 34.6 %
MCH RBC QN AUTO: 30 PG (ref 27–33)
MCHC RBC AUTO-ENTMCNC: 33.1 G/DL (ref 32–36)
MCV RBC AUTO: 90.8 FL (ref 80–100)
MONOCYTES # BLD AUTO: 506 CELLS/UL (ref 200–950)
MONOCYTES NFR BLD AUTO: 11 %
NEUTROPHILS # BLD AUTO: 2415 CELLS/UL (ref 1500–7800)
NEUTROPHILS NFR BLD AUTO: 52.5 %
NONHDLC SERPL-MCNC: 170 MG/DL (CALC)
PLATELET # BLD AUTO: 171 THOUSAND/UL (ref 140–400)
PMV BLD REES-ECKER: 9.9 FL (ref 7.5–12.5)
POTASSIUM SERPL-SCNC: 4.1 MMOL/L (ref 3.5–5.3)
PROT SERPL-MCNC: 6.5 G/DL (ref 6.1–8.1)
PSA SERPL-MCNC: 0.65 NG/ML
RBC # BLD AUTO: 4.66 MILLION/UL (ref 4.2–5.8)
SODIUM SERPL-SCNC: 140 MMOL/L (ref 135–146)
TRIGL SERPL-MCNC: 198 MG/DL
TSH SERPL-ACNC: 2.12 MIU/L (ref 0.4–4.5)
WBC # BLD AUTO: 4.6 THOUSAND/UL (ref 3.8–10.8)

## 2024-12-27 ENCOUNTER — PATIENT MESSAGE (OUTPATIENT)
Dept: FAMILY MEDICINE | Facility: CLINIC | Age: 74
End: 2024-12-27
Payer: MEDICARE

## 2025-01-30 DIAGNOSIS — Z00.00 ENCOUNTER FOR MEDICARE ANNUAL WELLNESS EXAM: ICD-10-CM

## 2025-06-01 ENCOUNTER — PATIENT MESSAGE (OUTPATIENT)
Dept: FAMILY MEDICINE | Facility: CLINIC | Age: 75
End: 2025-06-01
Payer: MEDICARE

## 2025-07-11 ENCOUNTER — OFFICE VISIT (OUTPATIENT)
Dept: FAMILY MEDICINE | Facility: CLINIC | Age: 75
End: 2025-07-11
Payer: MEDICARE

## 2025-07-11 VITALS
HEART RATE: 76 BPM | OXYGEN SATURATION: 98 % | SYSTOLIC BLOOD PRESSURE: 108 MMHG | WEIGHT: 227.19 LBS | DIASTOLIC BLOOD PRESSURE: 70 MMHG | TEMPERATURE: 98 F | HEIGHT: 73 IN | BODY MASS INDEX: 30.11 KG/M2

## 2025-07-11 DIAGNOSIS — G21.8 OTHER SECONDARY PARKINSONISM: ICD-10-CM

## 2025-07-11 DIAGNOSIS — G70.00 MYASTHENIA GRAVIS WITHOUT (ACUTE) EXACERBATION: ICD-10-CM

## 2025-07-11 DIAGNOSIS — C69.30 MALIGNANT NEOPLASM OF CHOROID, UNSPECIFIED LATERALITY: ICD-10-CM

## 2025-07-11 DIAGNOSIS — B35.6 JOCK ITCH: ICD-10-CM

## 2025-07-11 DIAGNOSIS — G62.9 NEUROPATHY: ICD-10-CM

## 2025-07-11 DIAGNOSIS — Z00.00 ENCOUNTER FOR PREVENTIVE HEALTH EXAMINATION: ICD-10-CM

## 2025-07-11 DIAGNOSIS — Z00.00 ENCOUNTER FOR MEDICARE ANNUAL WELLNESS EXAM: Primary | ICD-10-CM

## 2025-07-11 DIAGNOSIS — Z12.11 COLON CANCER SCREENING: ICD-10-CM

## 2025-07-11 DIAGNOSIS — G47.33 OSA (OBSTRUCTIVE SLEEP APNEA): ICD-10-CM

## 2025-07-11 PROCEDURE — 3078F DIAST BP <80 MM HG: CPT | Mod: CPTII,S$GLB,, | Performed by: PHYSICIAN ASSISTANT

## 2025-07-11 PROCEDURE — 1125F AMNT PAIN NOTED PAIN PRSNT: CPT | Mod: CPTII,S$GLB,, | Performed by: PHYSICIAN ASSISTANT

## 2025-07-11 PROCEDURE — 3288F FALL RISK ASSESSMENT DOCD: CPT | Mod: CPTII,S$GLB,, | Performed by: PHYSICIAN ASSISTANT

## 2025-07-11 PROCEDURE — 1100F PTFALLS ASSESS-DOCD GE2>/YR: CPT | Mod: CPTII,S$GLB,, | Performed by: PHYSICIAN ASSISTANT

## 2025-07-11 PROCEDURE — 1158F ADVNC CARE PLAN TLK DOCD: CPT | Mod: CPTII,S$GLB,, | Performed by: PHYSICIAN ASSISTANT

## 2025-07-11 PROCEDURE — 1160F RVW MEDS BY RX/DR IN RCRD: CPT | Mod: CPTII,S$GLB,, | Performed by: PHYSICIAN ASSISTANT

## 2025-07-11 PROCEDURE — 3074F SYST BP LT 130 MM HG: CPT | Mod: CPTII,S$GLB,, | Performed by: PHYSICIAN ASSISTANT

## 2025-07-11 PROCEDURE — G0439 PPPS, SUBSEQ VISIT: HCPCS | Mod: S$GLB,,, | Performed by: PHYSICIAN ASSISTANT

## 2025-07-11 PROCEDURE — 1159F MED LIST DOCD IN RCRD: CPT | Mod: CPTII,S$GLB,, | Performed by: PHYSICIAN ASSISTANT

## 2025-07-11 RX ORDER — KETOCONAZOLE 20 MG/G
CREAM TOPICAL DAILY
Qty: 60 G | Refills: 3 | Status: SHIPPED | OUTPATIENT
Start: 2025-07-11

## 2025-07-11 RX ORDER — IBUPROFEN 200 MG
1 TABLET ORAL
COMMUNITY

## 2025-07-11 NOTE — PROGRESS NOTES
"  Jose Rodríguez presented for a follow-up Medicare AWV today. The following components were reviewed and updated:    Medical history  Family History  Social history  Allergies and Current Medications  Health Risk Assessment  Health Maintenance  Care Team    **See Completed Assessments for Annual Wellness visit with in the encounter summary    The following assessments were completed:  Depression Screening  Cognitive function Screening  Timed Get Up Test  Whisper Test      Opioid documentation:      Patient does not have a current opioid prescription.          Vitals:    07/11/25 1517   BP: 108/70   BP Location: Right arm   Patient Position: Sitting   Pulse: 76   Temp: 97.5 °F (36.4 °C)   TempSrc: Oral   SpO2: 98%   Weight: 103 kg (227 lb 2.9 oz)   Height: 6' 1" (1.854 m)     Body mass index is 29.97 kg/m².       Physical Exam  Constitutional:       General: He is not in acute distress.     Appearance: Normal appearance.   HENT:      Head: Normocephalic and atraumatic.   Eyes:      General: Lids are normal. Gaze aligned appropriately.      Pupils: Pupils are equal, round, and reactive to light.   Neck:      Trachea: Trachea normal.   Cardiovascular:      Rate and Rhythm: Normal rate and regular rhythm.      Heart sounds: S1 normal and S2 normal.   Pulmonary:      Effort: Pulmonary effort is normal.      Breath sounds: Normal breath sounds.   Abdominal:      General: Bowel sounds are normal. There is no distension.      Palpations: Abdomen is soft.      Tenderness: There is no abdominal tenderness.   Musculoskeletal:      Cervical back: Neck supple.      Right lower leg: No edema.      Left lower leg: No edema.   Lymphadenopathy:      Cervical: No cervical adenopathy.   Skin:     General: Skin is cool and dry.   Neurological:      Mental Status: He is alert and oriented to person, place, and time.      Motor: Tremor present.      Comments: Ambulates with a cane   Psychiatric:         Attention and Perception: Attention " normal.         Mood and Affect: Mood normal.         Behavior: Behavior is cooperative.         Thought Content: Thought content normal.         Diagnoses and health risks identified today and associated recommendations/orders:    1. Encounter for Medicare annual wellness exam  - Referral to Enhanced Annual Wellness Visit (eAWV) W+1  - Chart reviewed. Problem list updated. Discussed current medical diagnosis, current medications, medical/surgical/family/social history; updated provider list; documented vital signs; identified any cognitive impairment; and updated risk factor list. Addressed any outstanding health maintenance. Provided patient with personalized health advice. Continue to follow up with PCP and any specialists.     2. Encounter for preventive health examination  - Chart reviewed. Problem list updated. Discussed current medical diagnosis, current medications, medical/surgical/family/social history; updated provider list; documented vital signs; identified any cognitive impairment; and updated risk factor list. Addressed any outstanding health maintenance. Provided patient with personalized health advice. Continue to follow up with PCP and any specialists.     3. Malignant neoplasm of choroid, unspecified laterality  - Chronic, stable  - Continue medications  - Follow with PCP    4. Myasthenia gravis without (acute) exacerbation  - Chronic, stable  - Continue medications  - Follow with PCP    5. Neuropathy  - Chronic, stable  - Continue medications  - Follow with PCP    6. Other secondary parkinsonism  - Chronic, stable  - Continue medications  - Follow with PCP    7. PROMISE (obstructive sleep apnea)  - Chronic, stable  - Continue medications  - Follow with PCP    8. Colon cancer screening  Orders  - Cologuard Screening (Multitarget Stool DNA)    9. BMI 29.0-29.9,adult  - Body mass index is 29.97 kg/m².  - Recommendation for healthy diet and increasing exercise as tolerated with goal of 150min/week.  Recommend weight loss.        Provided Jose with a 5-10 year written screening schedule and personal prevention plan. Recommendations were developed using the USPSTF age appropriate recommendations. Education, counseling, and referrals were provided as needed.  After Visit Summary printed and given to patient which includes a list of additional screenings\tests needed.    No follow-ups on file.      Debbie Mccoy PA-C      Advance Care Planning   I offered to discuss advanced care planning, including how to pick a person who would make decisions for you if you were unable to make them for yourself, called a health care power of , and what kind of decisions you might make such as use of life sustaining treatments such as ventilators and tube feeding when faced with a life limiting illness recorded on a living will that they will need to know. (How you want to be cared for as you near the end of your natural life)     X Patient is interested in learning more about how to make advanced directives.  I provided them paperwork and offered to discuss this with them.

## 2025-07-11 NOTE — PATIENT INSTRUCTIONS
Counseling and Referral of Other Preventative  (Italic type indicates deductible and co-insurance are waived)    Patient Name: Jose Rodríguez  Today's Date: 7/11/2025    Health Maintenance       Date Due Completion Date    Shingles Vaccine (1 of 2) Never done ---    Colorectal Cancer Screening 12/29/2023 12/28/2023    COVID-19 Vaccine (5 - 2024-25 season) 09/01/2024 7/18/2022    RSV Vaccine (Age 60+ and Pregnant patients) (1 - 1-dose 75+ series) 05/18/2026 ---    Lipid Panel 12/23/2029 12/23/2024    TETANUS VACCINE 11/27/2033 11/27/2023        No orders of the defined types were placed in this encounter.      The following information is provided to all patients.  This information is to help you find resources for any of the problems found today that may be affecting your health:                  Living healthy guide: www.[facility].[place].[facility]      Understanding Diabetes: www.diabetes.org      Eating healthy: www.cdc.gov/healthyweight      CDC home safety checklist: www.cdc.gov/steadi/patient.html      Agency on Aging: www.goea.[place].[facility]      Alcoholics anonymous (AA): www.aa.org      Physical Activity: www.bart.nih.gov/zk9vxvx      Tobacco use: www.quitwithusla.org

## 2025-07-17 DIAGNOSIS — Z00.00 ROUTINE HEALTH MAINTENANCE: Primary | ICD-10-CM

## 2025-07-17 RX ORDER — TADALAFIL 5 MG/1
TABLET ORAL
Qty: 90 TABLET | Refills: 1 | Status: SHIPPED | OUTPATIENT
Start: 2025-07-17

## 2025-07-17 NOTE — TELEPHONE ENCOUNTER
Refill Routing Note   Medication(s) are not appropriate for processing by [facility] for the following reason(s):        No active prescription written by provider    [facility] action(s):  Defer               Appointments  past 12m or future 3m with PCP    Date Provider   Last Visit   12/16/2024 Aj Patrick MD   Next Visit   12/8/2025 Aj Patrick MD   ED visits in past 90 days: 0        Note composed:6:37 PM 07/17/2025

## 2025-07-17 NOTE — TELEPHONE ENCOUNTER
No care due was identified.  [facility] Embedded Care Due Messages. Reference number: [id number].   7/17/2025 1:24:52 PM CDT

## 2025-07-25 ENCOUNTER — TELEPHONE (OUTPATIENT)
Dept: FAMILY MEDICINE | Facility: CLINIC | Age: 75
End: 2025-07-25
Payer: MEDICARE

## 2025-07-25 NOTE — TELEPHONE ENCOUNTER
Copied from CRM #[id number]. Topic: Appointments - Appointment Scheduling  >> Jul 25, 2025  1:17 PM Raji wrote:  .Type:  Sooner Apoointment Request    Caller is requesting a sooner appointment.  Pt requesting a message be sent to doctor.  Name of Caller:Pt  Would the patient rather a call back or a response via [facility]? Call  back  Best Call Back Number:[phone]  Additional Information: Pt. Is requesting a call back regarding he just left his Eye Specialist and was told to f/u with his PCP as soon as possible

## 2025-07-28 ENCOUNTER — OFFICE VISIT (OUTPATIENT)
Dept: FAMILY MEDICINE | Facility: CLINIC | Age: 75
End: 2025-07-28
Payer: MEDICARE

## 2025-07-28 VITALS
HEART RATE: 65 BPM | DIASTOLIC BLOOD PRESSURE: 68 MMHG | SYSTOLIC BLOOD PRESSURE: 100 MMHG | HEIGHT: 73 IN | TEMPERATURE: 98 F | OXYGEN SATURATION: 98 % | BODY MASS INDEX: 29.84 KG/M2 | WEIGHT: 225.19 LBS

## 2025-07-28 DIAGNOSIS — Z12.11 ENCOUNTER FOR COLORECTAL CANCER SCREENING: ICD-10-CM

## 2025-07-28 DIAGNOSIS — G70.00 MYASTHENIA GRAVIS WITHOUT (ACUTE) EXACERBATION: ICD-10-CM

## 2025-07-28 DIAGNOSIS — G47.33 OSA (OBSTRUCTIVE SLEEP APNEA): ICD-10-CM

## 2025-07-28 DIAGNOSIS — G62.9 NEUROPATHY: ICD-10-CM

## 2025-07-28 DIAGNOSIS — Z12.12 ENCOUNTER FOR COLORECTAL CANCER SCREENING: ICD-10-CM

## 2025-07-28 DIAGNOSIS — G20.B1 PARKINSON'S DISEASE WITH DYSKINESIA, UNSPECIFIED WHETHER MANIFESTATIONS FLUCTUATE: Primary | ICD-10-CM

## 2025-07-28 PROCEDURE — 1160F RVW MEDS BY RX/DR IN RCRD: CPT | Mod: CPTII,S$GLB,, | Performed by: FAMILY MEDICINE

## 2025-07-28 PROCEDURE — 3288F FALL RISK ASSESSMENT DOCD: CPT | Mod: CPTII,S$GLB,, | Performed by: FAMILY MEDICINE

## 2025-07-28 PROCEDURE — 1125F AMNT PAIN NOTED PAIN PRSNT: CPT | Mod: CPTII,S$GLB,, | Performed by: FAMILY MEDICINE

## 2025-07-28 PROCEDURE — 1100F PTFALLS ASSESS-DOCD GE2>/YR: CPT | Mod: CPTII,S$GLB,, | Performed by: FAMILY MEDICINE

## 2025-07-28 PROCEDURE — 3074F SYST BP LT 130 MM HG: CPT | Mod: CPTII,S$GLB,, | Performed by: FAMILY MEDICINE

## 2025-07-28 PROCEDURE — 99214 OFFICE O/P EST MOD 30 MIN: CPT | Mod: S$GLB,,, | Performed by: FAMILY MEDICINE

## 2025-07-28 PROCEDURE — 3078F DIAST BP <80 MM HG: CPT | Mod: CPTII,S$GLB,, | Performed by: FAMILY MEDICINE

## 2025-07-28 PROCEDURE — 1159F MED LIST DOCD IN RCRD: CPT | Mod: CPTII,S$GLB,, | Performed by: FAMILY MEDICINE

## 2025-07-28 PROCEDURE — 3008F BODY MASS INDEX DOCD: CPT | Mod: CPTII,S$GLB,, | Performed by: FAMILY MEDICINE

## 2025-08-04 ENCOUNTER — PATIENT OUTREACH (OUTPATIENT)
Dept: ADMINISTRATIVE | Facility: HOSPITAL | Age: 75
End: 2025-08-04
Payer: MEDICARE

## 2025-08-04 NOTE — PROGRESS NOTES
" Patient ID: Jose Rodríguez is a 75 y.o. male.    Chief Complaint: Follow-up and Hip Pain    HPI       Jose Rodríguez is a 75 y.o. male today because of history of having myasthenia gravis.  Wonders if symptoms are returning.  Treated with thymectomy years ago and had relatively no problems since then.  Having some irritation due to be eyelids drooping-no swallowing problems coughing up mucus              Review of Symptoms      /68 (BP Location: Right arm, Patient Position: Sitting)   Pulse 65   Temp 97.9 °F (36.6 °C) (Oral)   Ht 6' 1" (1.854 m)   Wt 102.2 kg (225 lb 3.2 oz)   SpO2 98%   BMI 29.71 kg/m²     Physical Exam    Vitals:    07/28/25 1528   BP: 100/68   BP Location: Right arm   Patient Position: Sitting   Pulse: 65   Temp: 97.9 °F (36.6 °C)   TempSrc: Oral   SpO2: 98%   Weight: 102.2 kg (225 lb 3.2 oz)   Height: 6' 1" (1.854 m)       Constitutional:   Oriented to person, place, and time.appears well-developed and well-nourished.   No distress.      HENT  Head: Normocephalic and atraumatic  Right Ear: External ear normal.   Left Ear: External ear normal.   Nose: External nose normal.   Mouth: Moist mucous membranes    Eyes:   Conjunctivae are normal. Right eye exhibits no discharge. Left eye exhibits no discharge. No scleral icterus. No periorbital edema    Musculoskeletal:  No edema. No obvious deformity No wasting     Neurological:  Alert and oriented to person, place, and time.  Bradykinesia.     Skin:   Skin is warm and dry.  No diaphoresis.   No rash noted.     Psychiatric: Normal mood and affect. Behavior is normal. Judgment and thought content normal.       Complete Blood Count  Lab Results   Component Value Date    RBC 4.66 12/23/2024    HGB 14.0 12/23/2024    HCT 42.3 12/23/2024    MCV 90.8 12/23/2024    MCH 30.0 12/23/2024    MCHC 33.1 12/23/2024    RDW 13.1 12/23/2024     12/23/2024    MPV 9.9 12/23/2024    GRAN 2.4 05/22/2020    GRAN 49.5 05/22/2020    LYMPH 1,592 " "12/23/2024    LYMPH 34.6 12/23/2024    MONO 506 12/23/2024    MONO 11.0 12/23/2024    EOS 60 12/23/2024    BASO 28 12/23/2024    EOSINOPHIL 1.3 12/23/2024    BASOPHIL 0.6 12/23/2024    DIFFMETHOD Automated 05/22/2020       Comprehensive Metabolic Panel  No results found for: "GLU", "BUN", "CREATININE", "NA", "K", "CL", "PROT", "ALBUMIN", "BILITOT", "AST", "ALKPHOS", "CO2", "ALT", "ANIONGAP", "EGFRNONAA", "ESTGFRAFRICA"    TSH  No results found for: "TSH"    Assessment / Plan:      ICD-10-CM ICD-9-CM   1. Parkinson's disease with dyskinesia, unspecified whether manifestations fluctuate  G20.B1 332.0   2. Encounter for colorectal cancer screening  Z12.11 V76.51    Z12.12 V76.41   3. Myasthenia gravis without (acute) exacerbation  G70.00 358.00   4. Neuropathy  G62.9 355.9   5. PROMISE (obstructive sleep apnea)  G47.33 327.23     Parkinson's disease with dyskinesia, unspecified whether manifestations fluctuate    Encounter for colorectal cancer screening    Myasthenia gravis without (acute) exacerbation    Neuropathy    PROMISE (obstructive sleep apnea)    Continue current medications     See neurologist for evaluation of myasthenia gravis in in his treatment in the maybe needed.  Not able to tolerate CPAP should    "

## 2025-08-04 NOTE — PROGRESS NOTES
Population Health Chart Review & Patient Outreach Details      Additional [facility] Health Notes:               Updates Requested / Reviewed:      Updated Care Coordination Note         Health Maintenance Topics Overdue:      [facility] Score: 1     Colon Cancer Screening    Shingles/Zoster Vaccine                  Health Maintenance Topic(s) Outreach Outcomes & Actions Taken:    Eye Exam - Outreach Outcomes & Actions Taken  : Non-Diabetic Eye External Records Uploaded, Care Team & History Updated if Applicable